# Patient Record
Sex: MALE | Race: WHITE | HISPANIC OR LATINO | Employment: UNEMPLOYED | URBAN - METROPOLITAN AREA
[De-identification: names, ages, dates, MRNs, and addresses within clinical notes are randomized per-mention and may not be internally consistent; named-entity substitution may affect disease eponyms.]

---

## 2017-01-19 ENCOUNTER — OFFICE VISIT (OUTPATIENT)
Dept: FAMILY MEDICINE | Facility: CLINIC | Age: 27
End: 2017-01-19

## 2017-01-19 VITALS
TEMPERATURE: 97.5 F | RESPIRATION RATE: 18 BRPM | WEIGHT: 291 LBS | BODY MASS INDEX: 43.1 KG/M2 | HEIGHT: 69 IN | DIASTOLIC BLOOD PRESSURE: 75 MMHG | HEART RATE: 70 BPM | SYSTOLIC BLOOD PRESSURE: 118 MMHG

## 2017-01-19 DIAGNOSIS — F43.23 ADJUSTMENT DISORDER WITH MIXED ANXIETY AND DEPRESSED MOOD: Primary | ICD-10-CM

## 2017-01-19 PROCEDURE — 99214 OFFICE O/P EST MOD 30 MIN: CPT | Performed by: PHYSICIAN ASSISTANT

## 2017-01-19 RX ORDER — HYDROXYZINE HYDROCHLORIDE 25 MG/1
25-50 TABLET, FILM COATED ORAL EVERY 6 HOURS PRN
Qty: 30 TABLET | Refills: 1 | Status: SHIPPED | OUTPATIENT
Start: 2017-01-19 | End: 2017-04-11

## 2017-01-19 RX ORDER — CITALOPRAM HYDROBROMIDE 20 MG/1
TABLET ORAL
Qty: 30 TABLET | Refills: 1 | Status: SHIPPED | OUTPATIENT
Start: 2017-01-19 | End: 2017-04-11

## 2017-01-19 ASSESSMENT — PATIENT HEALTH QUESTIONNAIRE - PHQ9: 5. POOR APPETITE OR OVEREATING: SEVERAL DAYS

## 2017-01-19 ASSESSMENT — ANXIETY QUESTIONNAIRES
3. WORRYING TOO MUCH ABOUT DIFFERENT THINGS: SEVERAL DAYS
IF YOU CHECKED OFF ANY PROBLEMS ON THIS QUESTIONNAIRE, HOW DIFFICULT HAVE THESE PROBLEMS MADE IT FOR YOU TO DO YOUR WORK, TAKE CARE OF THINGS AT HOME, OR GET ALONG WITH OTHER PEOPLE: SOMEWHAT DIFFICULT
1. FEELING NERVOUS, ANXIOUS, OR ON EDGE: SEVERAL DAYS
6. BECOMING EASILY ANNOYED OR IRRITABLE: NEARLY EVERY DAY
GAD7 TOTAL SCORE: 9
5. BEING SO RESTLESS THAT IT IS HARD TO SIT STILL: NOT AT ALL
2. NOT BEING ABLE TO STOP OR CONTROL WORRYING: NOT AT ALL
7. FEELING AFRAID AS IF SOMETHING AWFUL MIGHT HAPPEN: NEARLY EVERY DAY

## 2017-01-19 NOTE — NURSING NOTE
"Chief Complaint   Patient presents with     Anxiety     Initial /75 mmHg  Pulse 70  Temp(Src) 97.5  F (36.4  C) (Oral)  Resp 18  Ht 5' 9\" (1.753 m)  Wt 291 lb (131.997 kg)  BMI 42.95 kg/m2 Estimated body mass index is 42.95 kg/(m^2) as calculated from the following:    Height as of this encounter: 5' 9\" (1.753 m).    Weight as of this encounter: 291 lb (131.997 kg)..  BP completed using cuff size large-RA      "

## 2017-01-19 NOTE — MR AVS SNAPSHOT
"              After Visit Summary   2017    Romeo Gomez    MRN: [id number]           Patient Information     Date Of Birth          1990        Visit Information        Provider Department      2017 2:30 PM Reg Curry PA-C [facility]        Today's Diagnoses     Adjustment disorder with mixed anxiety and depressed mood    -  1        Follow-ups after your visit        Who to contact     If you have questions or need follow up information about today's clinic visit or your schedule please contact [facility] directly at [phone].  Normal or non-critical lab and imaging results will be communicated to you by [employer], letter or phone within 4 business days after the clinic has received the results. If you do not hear from us within 7 days, please contact the clinic through [employer] or phone. If you have a critical or abnormal lab result, we will notify you by phone as soon as possible.  Submit refill requests through [employer] or call your pharmacy and they will forward the refill request to us. Please allow 3 business days for your refill to be completed.          Additional Information About Your Visit        MyChart Information     [employer] lets you send messages to your doctor, view your test results, renew your prescriptions, schedule appointments and more. To sign up, go to [email or web address] . Click on \"Log in\" on the left side of the screen, which will take you to the Welcome page. Then click on \"Sign up Now\" on the right side of the page.     You will be asked to enter the access code listed below, as well as some personal information. Please follow the directions to create your username and password.     Your access code is: [email or web address]  Expires: 2017  9:44 AM     Your access code will  in 90 days. If you need help or a new code, please call your [facility] or [phone].        Care EveryWhere ID     This is " "your Care EveryWhere ID. This could be used by other organizations to access your [place] medical records  [id number]        Your Vitals Were     Pulse Temperature Respirations Height BMI (Body Mass Index)       70 97.5  F (36.4  C) (Oral) 18 5' 9\" (1.753 m) 42.95 kg/m2        Blood Pressure from Last 3 Encounters:   01/19/17 118/75   11/28/16 118/75   09/02/16 116/72    Weight from Last 3 Encounters:   01/19/17 291 lb (131.997 kg)   11/28/16 285 lb (129.275 kg)   09/02/16 279 lb (126.554 kg)              Today, you had the following     No orders found for display         Today's Medication Changes          These changes are accurate as of: 1/19/17  3:31 PM.  If you have any questions, ask your nurse or doctor.               Start taking these medicines.        Dose/Directions    citalopram 20 MG tablet   Commonly known as:  celeXA   Used for:  Adjustment disorder with mixed anxiety and depressed mood   Started by:  Reg Curry PA-C        Take 1/2 tablet (10 mg) for 1-2 weeks, then increase to 1 tablet orally daily   Quantity:  30 tablet   Refills:  1       hydrOXYzine 25 MG tablet   Commonly known as:  ATARAX   Used for:  Adjustment disorder with mixed anxiety and depressed mood   Started by:  Reg Curry PA-C        Dose:  25-50 mg   Take 1-2 tablets (25-50 mg) by mouth every 6 hours as needed for anxiety   Quantity:  30 tablet   Refills:  1            Where to get your medicines      These medications were sent to [facility] [address] [place] [address] AT [place] [address]  [id number] [place] [id number]    Hours:  24-hours Phone:  [phone]    - citalopram 20 MG tablet  - hydrOXYzine 25 MG tablet             Primary Care Provider Office Phone # Fax #    [facility] [phone]       [address] [place] [address]        Thank you!     Thank you for choosing [facility]  for " your care. Our goal is always to provide you with excellent care. Hearing back from our patients is one way we can continue to improve our services. Please take a few minutes to complete the written survey that you may receive in the mail after your visit with us. Thank you!             Your Updated Medication List - Protect others around you: Learn how to safely use, store and throw away your medicines at www.disposemymeds.org.          This list is accurate as of: 1/19/17  3:31 PM.  Always use your most recent med list.                   Brand Name Dispense Instructions for use    citalopram 20 MG tablet    celeXA    30 tablet    Take 1/2 tablet (10 mg) for 1-2 weeks, then increase to 1 tablet orally daily       hydrOXYzine 25 MG tablet    ATARAX    30 tablet    Take 1-2 tablets (25-50 mg) by mouth every 6 hours as needed for anxiety

## 2017-01-19 NOTE — PROGRESS NOTES
"  SUBJECTIVE:                                                    Romeo Gomez is a 26 year old male who presents to clinic today for the following health issues:      Anxiety Follow-Up    Status since last visit: little worse-not feeling like self-difficult to explain. Still having \"spacing\" episodes described at last visit. Also admits to being scarred to do anything that requires responsibilities.     Other associated symptoms:None    Complicating factors:   Significant life event: No   Current substance abuse: None  Depression symptoms: No  RIA-7 SCORE 1/19/2017   Total Score 9        GAD7     Has attempted zoloft in the past which resulted in worsening symptoms. This was only taken for a few days. Also, CBT was attempted, but due to lack of insurance, could not afford this. Has not done a session yet.     Problem list and histories reviewed & adjusted, as indicated.  Additional history: as documented    Problem list, Medication list, Allergies, and Medical/Social/Surgical histories reviewed in EPIC and updated as appropriate.    ROS:  Constitutional, psych, cardiovascular, pulmonary, gi systems are negative, except as otherwise noted.    OBJECTIVE:                                                    /75 mmHg  Pulse 70  Temp(Src) 97.5  F (36.4  C) (Oral)  Resp 18  Ht 5' 9\" (1.753 m)  Wt 291 lb (131.997 kg)  BMI 42.95 kg/m2  Body mass index is 42.95 kg/(m^2).  GENERAL: alert, no distress and obese  PSYCH: mentation appears normal, anxious and judgement and insight intact    Diagnostic Test Results:  none      ASSESSMENT/PLAN:                                                      (F43.23) Adjustment disorder with mixed anxiety and depressed mood  (primary encounter diagnosis)  Comment: spent long amount of time discussing management options between medications options as well CBT. States was going to attempt CBT, but because of cost, this was not started. Is will to attempt medication for now, " which I think will help symptoms to the point where insurance can be obtained. Follow up in 1 month.   Plan: hydrOXYzine (ATARAX) 25 MG tablet, citalopram         (CELEXA) 20 MG tablet        -Medication use and side effects discussed with the patient. Patient is in complete understanding and agreement with plan.         Follow up: 1 month    33 minutes. Greater than 50% of the time was spent face to face counseling regarding his conditions and treatment options as described above.       Reg Curry PA-C  [facility]

## 2017-01-20 ASSESSMENT — ANXIETY QUESTIONNAIRES: GAD7 TOTAL SCORE: 9

## 2017-01-20 ASSESSMENT — PATIENT HEALTH QUESTIONNAIRE - PHQ9: SUM OF ALL RESPONSES TO PHQ QUESTIONS 1-9: 6

## 2017-04-11 ENCOUNTER — RADIANT APPOINTMENT (OUTPATIENT)
Dept: GENERAL RADIOLOGY | Facility: CLINIC | Age: 27
End: 2017-04-11
Attending: PHYSICIAN ASSISTANT
Payer: COMMERCIAL

## 2017-04-11 ENCOUNTER — OFFICE VISIT (OUTPATIENT)
Dept: FAMILY MEDICINE | Facility: CLINIC | Age: 27
End: 2017-04-11
Payer: COMMERCIAL

## 2017-04-11 VITALS
BODY MASS INDEX: 41.62 KG/M2 | DIASTOLIC BLOOD PRESSURE: 70 MMHG | RESPIRATION RATE: 18 BRPM | WEIGHT: 281 LBS | OXYGEN SATURATION: 99 % | HEIGHT: 69 IN | TEMPERATURE: 98.2 F | SYSTOLIC BLOOD PRESSURE: 118 MMHG | HEART RATE: 67 BPM

## 2017-04-11 DIAGNOSIS — M25.512 ACUTE PAIN OF BOTH SHOULDERS: ICD-10-CM
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
## 2017-04-11 DIAGNOSIS — M25.532 LEFT WRIST PAIN: ICD-10-CM
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
## 2017-04-11 DIAGNOSIS — M54.2 CERVICALGIA: ICD-10-CM

## 2017-04-11 DIAGNOSIS — M25.511 ACUTE PAIN OF BOTH SHOULDERS: ICD-10-CM

## 2017-04-11 DIAGNOSIS — M25.562 ACUTE PAIN OF LEFT KNEE: ICD-10-CM

## 2017-04-11 PROCEDURE — 73030 X-RAY EXAM OF SHOULDER: CPT | Mod: LT

## 2017-04-11 PROCEDURE — 73030 X-RAY EXAM OF SHOULDER: CPT | Mod: RT

## 2017-04-11 PROCEDURE — 99214 OFFICE O/P EST MOD 30 MIN: CPT | Performed by: PHYSICIAN ASSISTANT

## 2017-04-11 PROCEDURE — 73130 X-RAY EXAM OF HAND: CPT | Mod: LT

## 2017-04-11 PROCEDURE — 72040 X-RAY EXAM NECK SPINE 2-3 VW: CPT

## 2017-04-11 PROCEDURE — 73562 X-RAY EXAM OF KNEE 3: CPT | Mod: LT

## 2017-04-11 RX ORDER — NAPROXEN 500 MG/1
500 TABLET ORAL 2 TIMES DAILY WITH MEALS
Qty: 60 TABLET | Refills: 1 | Status: SHIPPED | OUTPATIENT
Start: 2017-04-11 | End: 2017-08-05

## 2017-04-11 RX ORDER — CYCLOBENZAPRINE HCL 10 MG
5-10 TABLET ORAL 3 TIMES DAILY PRN
Qty: 30 TABLET | Refills: 1 | Status: SHIPPED | OUTPATIENT
Start: 2017-04-11 | End: 2017-08-05
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
## 2017-04-26 ENCOUNTER — RADIANT APPOINTMENT (OUTPATIENT)
Dept: GENERAL RADIOLOGY | Facility: CLINIC | Age: 27
End: 2017-04-26
Attending: PHYSICIAN ASSISTANT
Payer: COMMERCIAL

## 2017-04-26 ENCOUNTER — OFFICE VISIT (OUTPATIENT)
Dept: FAMILY MEDICINE | Facility: CLINIC | Age: 27
End: 2017-04-26
Payer: COMMERCIAL

## 2017-04-26 VITALS
SYSTOLIC BLOOD PRESSURE: 120 MMHG | DIASTOLIC BLOOD PRESSURE: 70 MMHG | HEART RATE: 57 BPM | TEMPERATURE: 98.1 F | WEIGHT: 280 LBS | RESPIRATION RATE: 16 BRPM | BODY MASS INDEX: 41.35 KG/M2

## 2017-04-26 DIAGNOSIS — M25.532 LEFT WRIST PAIN: ICD-10-CM

## 2017-04-26 DIAGNOSIS — M25.562 ACUTE PAIN OF BOTH KNEES: ICD-10-CM

## 2017-04-26 DIAGNOSIS — M25.561 ACUTE PAIN OF BOTH KNEES: ICD-10-CM

## 2017-04-26 DIAGNOSIS — M54.2 CERVICALGIA: ICD-10-CM
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
## 2017-04-26 DIAGNOSIS — R20.2 PARESTHESIA: ICD-10-CM

## 2017-04-26 PROCEDURE — 99213 OFFICE O/P EST LOW 20 MIN: CPT | Performed by: PHYSICIAN ASSISTANT

## 2017-04-26 PROCEDURE — 73110 X-RAY EXAM OF WRIST: CPT | Mod: LT
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
## 2017-05-19 ENCOUNTER — TELEPHONE (OUTPATIENT)
Dept: FAMILY MEDICINE | Facility: CLINIC | Age: 27
End: 2017-05-19

## 2017-05-19 NOTE — TELEPHONE ENCOUNTER
Panel Management Review      Patient has the following on his problem list:     Composite cancer screening  Chart review shows that this patient is due/due soon for the following None  Summary:    Patient is due/failing the following:   None -error encounter closed    Action needed:   error  Type of outreach:  None needed-error

## 2017-08-05 ENCOUNTER — HOSPITAL ENCOUNTER (EMERGENCY)
Facility: CLINIC | Age: 27
Discharge: HOME | End: 2017-08-05
Attending: EMERGENCY MEDICINE | Admitting: EMERGENCY MEDICINE

## 2017-08-05 VITALS
RESPIRATION RATE: 18 BRPM | HEART RATE: 98 BPM | TEMPERATURE: 98.9 F | SYSTOLIC BLOOD PRESSURE: 121 MMHG | HEIGHT: 69 IN | WEIGHT: 290 LBS | OXYGEN SATURATION: 99 % | DIASTOLIC BLOOD PRESSURE: 82 MMHG | BODY MASS INDEX: 42.95 KG/M2

## 2017-08-05 DIAGNOSIS — L03.90 CELLULITIS, UNSPECIFIED CELLULITIS SITE: ICD-10-CM

## 2017-08-05 PROCEDURE — 99283 EMERGENCY DEPT VISIT LOW MDM: CPT

## 2017-08-05 PROCEDURE — 25000132 ZZH RX MED GY IP 250 OP 250 PS 637: Performed by: EMERGENCY MEDICINE

## 2017-08-05 RX ORDER — CEPHALEXIN 500 MG/1
500 CAPSULE ORAL ONCE
Status: COMPLETED | OUTPATIENT
Start: 2017-08-05 | End: 2017-08-05

## 2017-08-05 RX ORDER — CEPHALEXIN 500 MG/1
500 CAPSULE ORAL 4 TIMES DAILY
Qty: 40 CAPSULE | Refills: 0 | Status: SHIPPED | OUTPATIENT
Start: 2017-08-05 | End: 2017-08-15

## 2017-08-05 RX ADMIN — CEPHALEXIN 500 MG: 500 CAPSULE ORAL at 00:53

## 2017-08-05 ASSESSMENT — ENCOUNTER SYMPTOMS
MYALGIAS: 1
FEVER: 0
COLOR CHANGE: 1
CHILLS: 0

## 2017-08-05 NOTE — ED PROVIDER NOTES
"  History     Chief Complaint:  Leg Swelling     HPI   Romeo Gomez is a 26 year old male who presents accompanied by his family for evaluation of leg swelling. Several days ago, the patient started to develop redness and swelling in his right shin after popping a pimple. This area has been warm to the touch and his pain worsens with palpation. Notably, the patient is a  and spends time resting on his knees, which particularly exacerbates his pain. Tonight, due to his ongoing symptoms the patient chose to seek evaluation in the ED with primary concern that he could have an infection. Currently in the ED, the patient rates his pain at a severity of 5/10. Otherwise, he has not had any fevers or chills in association with his current symptoms.     Allergies:  NKDA    Medications:    The patient is not currently taking any prescribed medications.     Past Medical History:    Anxiety  Obesity     Past Surgical History:    History reviewed. No pertinent past surgical history.     Family History:    Kidney disease - Father  Heart disease - Father     Social History:  Tobacco use:    Former smoker - 0.25 packs / day   Alcohol use:    Negative  Marital status:    Single   Accompanied to ED by:  Family      Review of Systems   Constitutional: Negative for chills and fever.   Cardiovascular: Positive for leg swelling (right ).   Musculoskeletal: Positive for myalgias (right lower leg).   Skin: Positive for color change (redness, right lower leg).   All other systems reviewed and are negative.    Physical Exam   First Vitals:  BP: 121/82  Pulse: 98  Temp: 98.9  F (37.2  C)  Resp: 18  Height: 175.3 cm (5' 9\")  Weight: 131.5 kg (290 lb)  SpO2: 99 %      Physical Exam  Constitutional:  Oriented to person, place, and time. Well-appearing.   HENT:   Head:    Normocephalic.   Mouth/Throat:   Oropharynx is clear and moist.   Eyes:    EOM are normal. Pupils are equal, round, and reactive to light.   Neck:    Neck " supple.   Musculoskeletal:  Erythema noted on 2/3rd's of his right proximal anterior tib/fib, non-circumferential. Warmth. Mildly tender to palpation. No edema. No posterior leg tenderness. No fluctuance or induration.   Neurological:   Alert and oriented to person, place, and time.           Moves all 4 extremities spontaneously       Right leg neurovascularly intact.   Skin:    Right leg erythema and warmth. No pallor.     Emergency Department Course     Interventions:  0053 Keflex 500 mg PO     Emergency Department Course:  Nursing notes and vitals reviewed.  0043: I performed an exam of the patient as documented above.     Findings and plan explained to the Patient and family. Patient discharged home with instructions regarding supportive care, medications, and reasons to return. The importance of close follow-up was reviewed. The patient was prescribed Keflex.      Impression & Plan      Medical Decision Making:  Romeo Gomez is a 26 year old male who presents for evaluation of skin redness.  The history, physical exam and supporting data are consistent with cellulitis.  There do not appear at this time to be any complication of cellulitis including necrotizing fascitis, lymphangitis, lymphadenitis, abscess, osteomyelitis, sepsis, or shock.  The patient is not immunosuppressed.  Supportive outpatient management is indicated with antibiotics.  Close follow-up of primary care physician to ensure no progression and rapid resolution.  Area of cellulitis was outlined.  Cellulitis precautions for home.        Diagnosis:    ICD-10-CM   1. Cellulitis, unspecified cellulitis site L03.90       Disposition:  Discharged to home with Keflex.     Discharge Medications:  New Prescriptions    CEPHALEXIN (KEFLEX) 500 MG CAPSULE    Take 1 capsule (500 mg) by mouth 4 times daily for 10 days         Rohan PERALTA am serving as a scribe at 12:43 AM on 8/5/2017 to document services personally performed by Dr. Blanton,  based on my observations and the provider's statements to me.      [facility] EMERGENCY DEPARTMENT       Joseph Blanton MD  08/05/17 0139

## 2017-08-05 NOTE — ED AVS SNAPSHOT
[facility] Emergency Department    [address] [place] [address]    Phone:  [phone]    Fax:  [phone]                                       Romeo Gomez   MRN: [id number]    Department:  [facility] Emergency Department   Date of Visit:  8/5/2017           Patient Information     Date Of Birth          1990        Your diagnoses for this visit were:     Cellulitis, unspecified cellulitis site        You were seen by Joseph Blanton MD.      Follow-up Information     Follow up with your doctor. Call in 3 days.        Discharge Instructions         Discharge Instructions  Cellulitis    Cellulitis is an infection of the skin that occurs when bacteria enter the skin.   Symptoms are generally redness, swelling, warmth and pain.  Your infection appeared to be appropriate to treat at home with antibiotics.  However, sometimes your infection may be worse than it seemed at first, or may worsen with time. If you have new or worse symptoms, you may need to be seen again in the Emergency Department or by your primary doctor.    Return to the Emergency Department if:    The redness, pain, or swelling gets a lot worse.  If the red area was marked, return if it is red beyond the marked area.    You are unable to get your antibiotics, or are vomiting them up or you can t take them.    You are feeling more ill, weak or lightheaded.    You start to run a new fever (temperature >101).    Anything else about the infection worries or concerns you.  Treatment:    Start your antibiotics right away, and take them as prescribed. Be sure to finish the whole prescription, even if you are better.    Apply a heating pad, warm packs, or warm water soaks to the infected area for 15 minutes at a time, at least 3 times a day. Do not use a heating pad on your feet or legs if you have diabetes. Do not sleep with a heating pad on, since this can cause burns or skin injury.    Rest  "your injured area for at least 1-2 days. After that you may start using your extremity again as long as there is not too much pain.     Raise the injured area above the level of your heart as much as possible in the first 1-2 days.    Tylenol  (acetaminophen), Motrin  (ibuprofen), or Advil  (ibuprofen) may help may help reduce pain and fever and may help you feel more comfortable. Be sure to read and follow the package directions, and ask your doctor if you have questions.    Follow-up with your doctor:    Re-check in clinic within 2-3 days.  Probiotics: If you have been given an antibiotic, you may want to also take a probiotic pill or eat yogurt with live cultures. Probiotics have \"good bacteria\" to help your intestines stay healthy. Studies have shown that probiotics help prevent diarrhea and other intestine problems (including C. diff infection) when you take antibiotics. You can buy these without a prescription in the pharmacy section of the store.     If you were given a prescription for medicine here today, be sure to read all of the information (including the package insert) that comes with your prescription.  This will include important information about the medicine, its side effects, and any warnings that you need to know about.  The pharmacist who fills the prescription can provide more information and answer questions you may have about the medicine.  If you have questions or concerns that the pharmacist cannot address, please call or return to the Emergency Department.     Opioid Medication Information    Pain medications are among the most commonly prescribed medicines, so we are including this information for all our patients. If you did not receive pain medication or get a prescription for pain medicine, you can ignore it.     You may have been given a prescription for an opioid (narcotic) pain medicine and/or have received a pain medicine while here in the Emergency Department. These medicines can " make you drowsy or impaired. You must not drive, operate dangerous equipment, or engage in any other dangerous activities while taking these medications. If you drive while taking these medications, you could be arrested for DUI, or driving under the influence. Do not drink any alcohol while you are taking these medications.     Opioid pain medications can cause addiction. If you have a history of chemical dependency of any type, you are at a higher risk of becoming addicted to pain medications.  Only take these prescribed medications to treat your pain when all other options have been tried. Take it for as short a time and as few doses as possible. Store your pain pills in a secure place, as they are frequently stolen and provide a dangerous opportunity for children or visitors in your house to start abusing these powerful medications. We will not replace any lost or stolen medicine.  As soon as your pain is better, you should flush all your remaining medication.     Many prescription pain medications contain Tylenol  (acetaminophen), including Vicodin , Tylenol #3 , Norco , Lortab , and Percocet .  You should not take any extra pills of Tylenol  if you are using these prescription medications or you can get very sick.  Do not ever take more than 3000 mg of acetaminophen in any 24 hour period.    All opioids tend to cause constipation. Drink plenty of water and eat foods that have a lot of fiber, such as fruits, vegetables, prune juice, apple juice and high fiber cereal.  Take a laxative if you don t move your bowels at least every other day. Miralax , Milk of Magnesia, Colace , or Senna  can be used to keep you regular.      Remember that you can always come back to the Emergency Department if you are not able to see your regular doctor in the amount of time listed above, if you get any new symptoms, or if there is anything that worries you.        24 Hour Appointment Hotline       To make an appointment at any  [facility], call [phone] ([phone]). If you don't have a family doctor or clinic, we will help you find one. [facility] are conveniently located to serve the needs of you and your family.             Review of your medicines      START taking        Dose / Directions Last dose taken    cephALEXin 500 MG capsule   Commonly known as:  KEFLEX   Dose:  500 mg   Quantity:  40 capsule        Take 1 capsule (500 mg) by mouth 4 times daily for 10 days   Refills:  0          Our records show that you are taking the medicines listed below. If these are incorrect, please call your family doctor or clinic.        Dose / Directions Last dose taken    * order for DME   Quantity:  1 Device        Equipment being ordered: left knee brace   Refills:  0        * order for DME   Quantity:  1 Device        Equipment being ordered: left thumb spica   Refills:  0        * Notice:  This list has 2 medication(s) that are the same as other medications prescribed for you. Read the directions carefully, and ask your doctor or other care provider to review them with you.            Prescriptions were sent or printed at these locations (1 Prescription)                   Other Prescriptions                Printed at Department/Unit printer (1 of 1)         cephALEXin (KEFLEX) 500 MG capsule                Orders Needing Specimen Collection     None      Pending Results     No orders found from 8/3/2017 to 8/6/2017.            Pending Culture Results     No orders found from 8/3/2017 to 8/6/2017.            Pending Results Instructions     If you had any lab results that were not finalized at the time of your Discharge, you can call the ED Lab Result RN at [phone]. You will be contacted by this team for any positive Lab results or changes in treatment. The nurses are available 7 days a week from 10A to 6:30P.  You can leave a message 24 hours per day and they will return your call.        Test Results From Your  Hospital Stay               Clinical Quality Measure: Blood Pressure Screening     Your blood pressure was checked while you were in the emergency department today. The last reading we obtained was  BP: 121/82 . Please read the guidelines below about what these numbers mean and what you should do about them.  If your systolic blood pressure (the top number) is less than 120 and your diastolic blood pressure (the bottom number) is less than 80, then your blood pressure is normal. There is nothing more that you need to do about it.  If your systolic blood pressure (the top number) is 120-139 or your diastolic blood pressure (the bottom number) is 80-89, your blood pressure may be higher than it should be. You should have your blood pressure rechecked within a year by a primary care provider.  If your systolic blood pressure (the top number) is 140 or greater or your diastolic blood pressure (the bottom number) is 90 or greater, you may have high blood pressure. High blood pressure is treatable, but if left untreated over time it can put you at risk for heart attack, stroke, or kidney failure. You should have your blood pressure rechecked by a primary care provider within the next 4 weeks.  If your provider in the emergency department today gave you specific instructions to follow-up with your doctor or provider even sooner than that, you should follow that instruction and not wait for up to 4 weeks for your follow-up visit.        Thank you for choosing [facility]       Thank you for choosing [facility] for your care. Our goal is always to provide you with excellent care. Hearing back from our patients is one way we can continue to improve our services. Please take a few minutes to complete the written survey that you may receive in the mail after you visit with us. Thank you!        [employer] Information     [employer] lets you send messages to your doctor, view your test results, renew your prescriptions, schedule  "appointments and more. To sign up, go to [email or web address] . Click on \"Log in\" on the left side of the screen, which will take you to the Welcome page. Then click on \"Sign up Now\" on the right side of the page.     You will be asked to enter the access code listed below, as well as some personal information. Please follow the directions to create your username and password.     Your access code is: [email or web address]  Expires: 11/3/2017 12:49 AM     Your access code will  in 90 days. If you need help or a new code, please call your [place] clinic or [phone].        Care EveryWhere ID     This is your Care EveryWhere ID. This could be used by other organizations to access your [place] medical records  [id number]        Equal Access to Services     VICTOR HUGO DESAI : Carolin Galvan, sage hooks, abebe santana, marvin muñiz . So [facility] [phone].    ATENCIÓN: Si habla español, tiene a gonzalez disposición servicios gratuitos de asistencia lingüística. Llame al [phone].    We comply with applicable federal civil rights laws and [place] laws. We do not discriminate on the basis of race, color, national origin, age, disability sex, sexual orientation or gender identity.            After Visit Summary       This is your record. Keep this with you and show to your community pharmacist(s) and doctor(s) at your next visit.                  "

## 2017-08-05 NOTE — ED NOTES
Right lower leg  Swelling red and warm to touch noted today after work   No fever or chills   Here for eval

## 2017-08-05 NOTE — ED AVS SNAPSHOT
[facility] Emergency Department    [address]    [facility] [address]    Phone:  [phone]    Fax:  [phone]                                       Romeo Gomez   MRN: [id number]    Department:  [facility] Emergency Department   Date of Visit:  8/5/2017           After Visit Summary Signature Page     I have received my discharge instructions, and my questions have been answered. I have discussed any challenges I see with this plan with the nurse or doctor.    ..........................................................................................................................................  Patient/Patient Representative Signature      ..........................................................................................................................................  Patient Representative Print Name and Relationship to Patient    ..................................................               ................................................  Date                                            Time    ..........................................................................................................................................  Reviewed by Signature/Title    ...................................................              ..............................................  Date                                                            Time

## 2017-11-20 PROBLEM — F41.9 ANXIETY: Status: ACTIVE | Noted: 2017-11-20

## 2018-05-04 ENCOUNTER — HOSPITAL ENCOUNTER (EMERGENCY)
Facility: CLINIC | Age: 28
Discharge: HOME | End: 2018-05-04
Attending: EMERGENCY MEDICINE | Admitting: EMERGENCY MEDICINE

## 2018-05-04 ENCOUNTER — APPOINTMENT (OUTPATIENT)
Dept: GENERAL RADIOLOGY | Facility: CLINIC | Age: 28
End: 2018-05-04
Attending: EMERGENCY MEDICINE

## 2018-05-04 VITALS
DIASTOLIC BLOOD PRESSURE: 103 MMHG | TEMPERATURE: 98.5 F | RESPIRATION RATE: 14 BRPM | SYSTOLIC BLOOD PRESSURE: 156 MMHG | HEART RATE: 76 BPM | OXYGEN SATURATION: 100 %

## 2018-05-04 PROCEDURE — 99284 EMERGENCY DEPT VISIT MOD MDM: CPT

## 2018-05-04 PROCEDURE — 29130 APPL FINGER SPLINT STATIC: CPT | Mod: F3

## 2018-05-04 PROCEDURE — 25000132 ZZH RX MED GY IP 250 OP 250 PS 637: Performed by: EMERGENCY MEDICINE

## 2018-05-04 PROCEDURE — 73140 X-RAY EXAM OF FINGER(S): CPT | Mod: LT

## 2018-05-04 RX ORDER — IBUPROFEN 600 MG/1
600 TABLET, FILM COATED ORAL ONCE
Status: COMPLETED | OUTPATIENT
Start: 2018-05-04 | End: 2018-05-04

## 2018-05-04 RX ADMIN — IBUPROFEN 600 MG: 600 TABLET ORAL at 22:58

## 2018-05-04 ASSESSMENT — ENCOUNTER SYMPTOMS: ARTHRALGIAS: 1

## 2018-05-04 NOTE — ED AVS SNAPSHOT
[facility] Emergency Department    [address]    [facility] [address]    Phone:  [phone]    Fax:  [phone]                                       Romeo Gomez   MRN: [id number]    Department:  [facility] Emergency Department   Date of Visit:  5/4/2018           Patient Information     Date Of Birth          1990        Your diagnoses for this visit were:     Strain of left ring finger, initial encounter        You were seen by Shayy Lawrence MD.      Follow-up Information     Follow up with [facility].    Why:  within 3-5 days (call the number provided on the card on Monday)        Follow up with [facility] Emergency Department.    Specialty:  EMERGENCY MEDICINE    Why:  As needed, If symptoms worsen    Contact information:    [address]  [facility] [address]  [phone]      Discharge References/Attachments     FINGER SPRAIN (ENGLISH)      24 Hour Appointment Hotline       To make an appointment at any [facility] clinic, call [phone]). If you don't have a family doctor or clinic, we will help you find one. [facility] clinics are conveniently located to serve the needs of you and your family.             Review of your medicines      Our records show that you are taking the medicines listed below. If these are incorrect, please call your family doctor or clinic.        Dose / Directions Last dose taken    * order for DME   Quantity:  1 Device        Equipment being ordered: left knee brace   Refills:  0        * order for DME   Quantity:  1 Device        Equipment being ordered: left thumb spica   Refills:  0        * Notice:  This list has 2 medication(s) that are the same as other medications prescribed for you. Read the directions carefully, and ask your doctor or other care provider to review them with you.            Procedures and tests performed during  your visit     Fingers XR, 2-3 views, left      Orders Needing Specimen Collection     None      Pending Results     No orders found from 5/2/2018 to 5/5/2018.            Pending Culture Results     No orders found from 5/2/2018 to 5/5/2018.            Pending Results Instructions     If you had any lab results that were not finalized at the time of your Discharge, you can call the ED Lab Result RN at [phone]. You will be contacted by this team for any positive Lab results or changes in treatment. The nurses are available 7 days a week from 10A to 6:30P.  You can leave a message 24 hours per day and they will return your call.        Test Results From Your Hospital Stay        5/4/2018 10:49 PM      Narrative     FINGER(S) TWO-THREE VIEWS LEFT  5/4/2018 10:45 PM     HISTORY: PAIN AND SWELLING;     COMPARISON: None.    FINDINGS: There is normal osseous alignment.  No fractures are  identified.        Impression     IMPRESSION: Negative, osseous structures appear intact.    AMBER WRIGHT MD                Clinical Quality Measure: Blood Pressure Screening     Your blood pressure was checked while you were in the emergency department today. The last reading we obtained was  BP: (!) 156/103 . Please read the guidelines below about what these numbers mean and what you should do about them.  If your systolic blood pressure (the top number) is less than 120 and your diastolic blood pressure (the bottom number) is less than 80, then your blood pressure is normal. There is nothing more that you need to do about it.  If your systolic blood pressure (the top number) is 120-139 or your diastolic blood pressure (the bottom number) is 80-89, your blood pressure may be higher than it should be. You should have your blood pressure rechecked within a year by a primary care provider.  If your systolic blood pressure (the top number) is 140 or greater or your diastolic blood pressure (the bottom number) is 90 or greater, you may  "have high blood pressure. High blood pressure is treatable, but if left untreated over time it can put you at risk for heart attack, stroke, or kidney failure. You should have your blood pressure rechecked by a primary care provider within the next 4 weeks.  If your provider in the emergency department today gave you specific instructions to follow-up with your doctor or provider even sooner than that, you should follow that instruction and not wait for up to 4 weeks for your follow-up visit.        Thank you for choosing [facility]       Thank you for choosing [facility] for your care. Our goal is always to provide you with excellent care. Hearing back from our patients is one way we can continue to improve our services. Please take a few minutes to complete the written survey that you may receive in the mail after you visit with us. Thank you!        [employer] Information     [employer] lets you send messages to your doctor, view your test results, renew your prescriptions, schedule appointments and more. To sign up, go to [email or web address] . Click on \"Log in\" on the left side of the screen, which will take you to the Welcome page. Then click on \"Sign up Now\" on the right side of the page.     You will be asked to enter the access code listed below, as well as some personal information. Please follow the directions to create your username and password.     Your access code is: [email or web address]  Expires: 2018  1:26 PM     Your access code will  in 90 days. If you need help or a new code, please call your [facility] clinic or [phone].        Care EveryWhere ID     This is your Care EveryWhere ID. This could be used by other organizations to access your [facility] medical records  [id number]        Equal Access to Services     VICTOR HUGO DESAI : sage Duque qaybta kaalmada adeegyada, waxay idiin hayaan adeeg kharash la'aan ah. So [facility] [phone].    ATENCIÓN: Si raquel banks, " tiene a gonzalez disposición servicios gratuitos de asistencia lingüística. Llcandice al [phone].    We comply with applicable federal civil rights laws and [place] laws. We do not discriminate on the basis of race, color, national origin, age, disability, sex, sexual orientation, or gender identity.            After Visit Summary       This is your record. Keep this with you and show to your community pharmacist(s) and doctor(s) at your next visit.

## 2018-05-04 NOTE — ED AVS SNAPSHOT
[facility] Emergency Department    [address]    [facility] [address]    Phone:  [phone]    Fax:  [phone]                                       Romeo Gomez   MRN: [id number]    Department:  [facility] Emergency Department   Date of Visit:  5/4/2018           After Visit Summary Signature Page     I have received my discharge instructions, and my questions have been answered. I have discussed any challenges I see with this plan with the nurse or doctor.    ..........................................................................................................................................  Patient/Patient Representative Signature      ..........................................................................................................................................  Patient Representative Print Name and Relationship to Patient    ..................................................               ................................................  Date                                            Time    ..........................................................................................................................................  Reviewed by Signature/Title    ...................................................              ..............................................  Date                                                            Time

## 2018-05-05 NOTE — ED PROVIDER NOTES
"  History     Chief Complaint:  Hand Pain      HPI   Romeo Gomez is a 27 year old male who presents to the emergency department today for evaluation of hand pain. For the last 3 weeks, the patient has been unable to fully flex his left 4th digit secondary to pain near the MCP joint. Today, he was driving when his finger slipped and \"cracked\" causing him to have increased pain. Here, he notes no injury or trauma to his finger though admits to heavy lifting at work and believes his finger could be strained. He is left hand dominant. Pain occasionally radiates upwards to his hand, specifically when he rests his arm in a certain position. No other concerns voiced at this time.     Allergies:  No Known Drug Allergies     Medications:    The patient is currently on no regular medications.    Past Medical History:    Anxiety  Obesity     Past Surgical History:    History reviewed. No pertinent past surgical history.    Family History:    Kidney disease  Heart disease     Social History:  The patient was accompanied to the ED by family.  Smoking Status: Former smoker  Smokeless Tobacco: Never used  Alcohol Use: No  Marital Status:  Single [1]    Review of Systems   Musculoskeletal: Positive for arthralgias.   All other systems reviewed and are negative.    Physical Exam   First Vitals:  BP: (!) 156/103  Pulse: 76  Heart Rate: 76  Temp: 98.5  F (36.9  C)  Resp: 14  SpO2: 100 %    Physical Exam  General: Adult male sitting upright   MSK: No edema. Nontender. Normal active range of motion. Tender over the proximal left ring finger with no edema, crepitus, or deformity, 5/5 extension strength of all joints of left ring finger, 4/5 strength with pain with flexion at all joints of left ring finger. Nontender over the rest of the left hand.  Skin: Warm and dry. No rashes or lesions or ecchymoses on visible skin. No distal cyanosis or pallor  Neuro: Alert and oriented. Responds appropriately to all questions and " commands. No focal findings appreciated. Normal muscle tone. Sensation intact to light touch over the left ring finger.   Psych: Normal mood and affect. Pleasant.    Emergency Department Course   Imaging:  Radiology findings were communicated with the patient who voiced understanding of the findings.    Fingers XR, 2-3 Views, Left:  Negative, osseous structures appear intact.  Reading per radiology    Interventions:  2258- Ibuprofen 600 mg Oral      Emergency Department Course:  Nursing notes and vitals reviewed.  I performed an exam of the patient as documented above.     The patient was sent for a XR while in the emergency department, results above.     I discussed the treatment plan with the patient. They expressed understanding of this plan and consented to discharge.     Impression & Plan      Medical Decision Making:  Romeo Gomez is a 27 year old left hand dominant male who presents to the emergency department with concern for left ring finger pain. He did not have any direct trauma to it but does a job where he chronically uses his fingers to heavy life. It is possible he may have strained his finger as this is what his symptoms seem most consistent with. His neurovascularly intact and XR does not show signs for fracture of dislocation. He is placed in a alumafoam splint and buddy taped for comfort. He should follow up with [facility] in the next 3-5 days as needed. All questions answered prior to discharge. Tylenol and Ibuprofen as needed for pain.       Diagnosis:    ICD-10-CM    1. Strain of left ring finger, initial encounter S66.912A          Disposition:   Findings and plan explained to the Patient. Patient discharged home with instructions regarding supportive care, medications, and reasons to return. The importance of close follow-up was reviewed.     Scribe Disclosure:  Edwin PERALTA, am serving as a scribe at 10:30 PM on 5/4/2018 to document services personally  performed by Shayy Lawrence MD, based on my observations and the provider's statements to me.    5/4/2018   [facility] EMERGENCY DEPARTMENT       Shayy Lawrence MD  05/07/18 1515

## 2018-05-05 NOTE — ED TRIAGE NOTES
Patient comes in for evaluation of pain in the left ring finger which has been bothering him for about 3 weeks, no injury. Patient tonight was turning the steering wheel and he heard a crack and the pain became more severe and now it is more difficult to move.

## 2018-08-07 ENCOUNTER — APPOINTMENT (OUTPATIENT)
Dept: GENERAL RADIOLOGY | Facility: CLINIC | Age: 28
End: 2018-08-07
Attending: EMERGENCY MEDICINE

## 2018-08-07 ENCOUNTER — HOSPITAL ENCOUNTER (EMERGENCY)
Facility: CLINIC | Age: 28
Discharge: HOME | End: 2018-08-08
Attending: EMERGENCY MEDICINE | Admitting: EMERGENCY MEDICINE

## 2018-08-07 DIAGNOSIS — M71.162 SEPTIC PREPATELLAR BURSITIS OF LEFT KNEE: ICD-10-CM

## 2018-08-07 PROCEDURE — 73562 X-RAY EXAM OF KNEE 3: CPT | Mod: LT

## 2018-08-07 PROCEDURE — 99284 EMERGENCY DEPT VISIT MOD MDM: CPT

## 2018-08-07 PROCEDURE — 29505 APPLICATION LONG LEG SPLINT: CPT | Mod: LT

## 2018-08-07 ASSESSMENT — ENCOUNTER SYMPTOMS
FEVER: 0
COLOR CHANGE: 1
ARTHRALGIAS: 1
JOINT SWELLING: 1

## 2018-08-07 NOTE — ED AVS SNAPSHOT
[facility] Emergency Department    [address]    [facility] [address]    Phone:  [phone]    Fax:  [phone]                                       Romeo Gomez   MRN: [id number]    Department:  [facility] Emergency Department   Date of Visit:  8/7/2018           After Visit Summary Signature Page     I have received my discharge instructions, and my questions have been answered. I have discussed any challenges I see with this plan with the nurse or doctor.    ..........................................................................................................................................  Patient/Patient Representative Signature      ..........................................................................................................................................  Patient Representative Print Name and Relationship to Patient    ..................................................               ................................................  Date                                            Time    ..........................................................................................................................................  Reviewed by Signature/Title    ...................................................              ..............................................  Date                                                            Time

## 2018-08-07 NOTE — ED AVS SNAPSHOT
[facility] Emergency Department    [address]    [facility] [address]    Phone:  [phone]    Fax:  [phone]                                       Romeo Gomez   MRN: [id number]    Department:  [facility] Emergency Department   Date of Visit:  8/7/2018           Patient Information     Date Of Birth          1990        Your diagnoses for this visit were:     Septic prepatellar bursitis of left knee        You were seen by Radha Negrete MD.      Follow-up Information     Follow up with [facility] In 1 day.    Contact information:    [address], [facility] [address]  [facility] [address]  [phone]          Discharge Instructions         Bursitis  You have bursitis. This is an inflammation of the bursa. These are small, fluid-filled sacs that surround the larger joints of the body. The bursa help the muscles and tendons move smoothly over the joints.  Bursitis often happens in the shoulder. But it can also affect the elbows, hips, pelvis, knees, toes, and heels. Bursitis can be caused by injury, overuse of the joint, or infection of the bursa. Symptoms include pain and tenderness over a joint. Symptoms get worse with movement.  Bursitis is treated with an anti-inflammatory medicine and by resting the joint. More severe cases require injection of medicine directly into the bursa.    Home care    Rest the painful joint and protect it from movement. This will allow the inflammation to heal faster.    Apply an ice pack over the injured area for no more than 15 to 20 minutes. Do this every 3 to 6 hours for the first 24 to 48 hours. Keep using ice packs 3 to 4 times a day until the pain and swelling improves.     To make an ice pack, put ice cubes in a sealed plastic zip-lock bag. Wrap the bag in a clean, thin towel or cloth. Never put ice or an ice pack directly on the skin. As the ice melts, be careful to avoid getting any wrap or  splint wet.    You may take over-the-counter pain medicine to treat pain and inflammation, unless another medicine was prescribed. Anti-inflammatory pain medicines may be more effective. Talk with your provider beforeusing these medicines if you have chronic liver or kidney disease, or ever had a stomach ulcer or GI (gastrointestinal) bleeding.    As your symptoms improve, slowly begin to move the joint. Do not overuse the joint. This may cause the symptoms to flare up again.  When to seek medical advice  Call your healthcare provider right away if any of these occur:    Redness over the painful area    Increasing pain or swelling at the joint    Fever of 100.4 F (38 C) or above lasting for 24 to 48 hours  Date Last Reviewed: 11/21/2015 2000-2017 [employer] [address] [place] [address], [place] [address]. All rights reserved. This information is not intended as a substitute for professional medical care. Always follow your healthcare professional's instructions.          24 Hour Appointment Hotline       To make an appointment at any [facility], call [phone] ([phone]). If you don't have a family doctor or clinic, we will help you find one. [facility] are conveniently located to serve the needs of you and your family.             Review of your medicines      START taking        Dose / Directions Last dose taken    cephALEXin 500 MG capsule   Commonly known as:  KEFLEX   Dose:  500 mg   Quantity:  20 capsule        Take 1 capsule (500 mg) by mouth 4 times daily for 5 days   Refills:  0        HYDROcodone-acetaminophen 5-325 MG per tablet   Commonly known as:  NORCO   Dose:  1 tablet   Quantity:  10 tablet        Take 1 tablet by mouth every 6 hours as needed for severe pain   Refills:  0        ibuprofen 200 MG tablet   Commonly known as:  ADVIL/MOTRIN   Dose:  400 mg   Quantity:  60 tablet        Take 2 tablets (400 mg) by mouth every 8 hours as needed for pain   Refills:  0           Our records show that you are taking the medicines listed below. If these are incorrect, please call your family doctor or clinic.        Dose / Directions Last dose taken    * order for DME   Quantity:  1 Device        Equipment being ordered: left knee brace   Refills:  0        * order for DME   Quantity:  1 Device        Equipment being ordered: left thumb spica   Refills:  0        * Notice:  This list has 2 medication(s) that are the same as other medications prescribed for you. Read the directions carefully, and ask your doctor or other care provider to review them with you.            Information about OPIOIDS     PRESCRIPTION OPIOIDS: WHAT YOU NEED TO KNOW   We gave you an opioid (narcotic) pain medicine. It is important to manage your pain, but opioids are not always the best choice. You should first try all the other options your care team gave you. Take this medicine for as short a time (and as few doses) as possible.    Some activities can increase your pain, such as bandage changes or therapy sessions. It may help to take your pain medicine 30 to 60 minutes before these activities. Reduce your stress by getting enough sleep, working on hobbies you enjoy and practicing relaxation or meditation. Talk to your care team about ways to manage your pain beyond prescription opioids.    These medicines have risks:    DO NOT drive when on new or higher doses of pain medicine. These medicines can affect your alertness and reaction times, and you could be arrested for driving under the influence (DUI). If you need to use opioids long-term, talk to your care team about driving.    DO NOT operate heavy machinery    DO NOT do any other dangerous activities while taking these medicines.    DO NOT drink any alcohol while taking these medicines.     If the opioid prescribed includes acetaminophen, DO NOT take with any other medicines that contain acetaminophen. Read all labels carefully. Look for the word   acetaminophen  or  Tylenol.  Ask your pharmacist if you have questions or are unsure.    You can get addicted to pain medicines, especially if you have a history of addiction (chemical, alcohol or substance dependence). Talk to your care team about ways to reduce this risk.    All opioids tend to cause constipation. Drink plenty of water and eat foods that have a lot of fiber, such as fruits, vegetables, prune juice, apple juice and high-fiber cereal. Take a laxative (Miralax, milk of magnesia, Colace, Senna) if you don t move your bowels at least every other day. Other side effects include upset stomach, sleepiness, dizziness, throwing up, tolerance (needing more of the medicine to have the same effect), physical dependence and slowed breathing.    Store your pills in a secure place, locked if possible. We will not replace any lost or stolen medicine. If you don t finish your medicine, please throw away (dispose) as directed by your pharmacist. The [place] [employer] has more information about safe disposal: https://www.pca.[facility].mn.us/living-green/managing-unwanted-medications        Prescriptions were sent or printed at these locations (3 Prescriptions)                   Other Prescriptions                Printed at Department/Unit printer (3 of 3)         cephALEXin (KEFLEX) 500 MG capsule               HYDROcodone-acetaminophen (NORCO) 5-325 MG per tablet               ibuprofen (ADVIL/MOTRIN) 200 MG tablet                Procedures and tests performed during your visit     XR Knee Left 3 Views      Orders Needing Specimen Collection     None      Pending Results     Date and Time Order Name Status Description    8/7/2018 4147 XR Knee Left 3 Views Preliminary             Pending Culture Results     No orders found for last 3 day(s).            Pending Results Instructions     If you had any lab results that were not finalized at the time of your Discharge, you can call the ED Lab Result RN at  [phone]. You will be contacted by this team for any positive Lab results or changes in treatment. The nurses are available 7 days a week from 10A to 6:30P.  You can leave a message 24 hours per day and they will return your call.        Test Results From Your Hospital Stay        8/8/2018 12:25 AM      Narrative     XR KNEE LT 3 VW  8/8/2018 12:09 AM     HISTORY: Pain, likely bursitis.     COMPARISON: None.         Impression     IMPRESSION: No acute fracture or dislocation.                Clinical Quality Measure: Blood Pressure Screening     Your blood pressure was checked while you were in the emergency department today. The last reading we obtained was  BP: (!) 132/94 . Please read the guidelines below about what these numbers mean and what you should do about them.  If your systolic blood pressure (the top number) is less than 120 and your diastolic blood pressure (the bottom number) is less than 80, then your blood pressure is normal. There is nothing more that you need to do about it.  If your systolic blood pressure (the top number) is 120-139 or your diastolic blood pressure (the bottom number) is 80-89, your blood pressure may be higher than it should be. You should have your blood pressure rechecked within a year by a primary care provider.  If your systolic blood pressure (the top number) is 140 or greater or your diastolic blood pressure (the bottom number) is 90 or greater, you may have high blood pressure. High blood pressure is treatable, but if left untreated over time it can put you at risk for heart attack, stroke, or kidney failure. You should have your blood pressure rechecked by a primary care provider within the next 4 weeks.  If your provider in the emergency department today gave you specific instructions to follow-up with your doctor or provider even sooner than that, you should follow that instruction and not wait for up to 4 weeks for your follow-up visit.        Thank you for  "choosing [facility]       Thank you for choosing [facility] for your care. Our goal is always to provide you with excellent care. Hearing back from our patients is one way we can continue to improve our services. Please take a few minutes to complete the written survey that you may receive in the mail after you visit with us. Thank you!        [employer] Information     [employer] lets you send messages to your doctor, view your test results, renew your prescriptions, schedule appointments and more. To sign up, go to [email or web address] . Click on \"Log in\" on the left side of the screen, which will take you to the Welcome page. Then click on \"Sign up Now\" on the right side of the page.     You will be asked to enter the access code listed below, as well as some personal information. Please follow the directions to create your username and password.     Your access code is: [email or web address]  Expires: 2018 12:37 AM     Your access code will  in 90 days. If you need help or a new code, please call your [facility] clinic or [phone].        Care EveryWhere ID     This is your Care EveryWhere ID. This could be used by other organizations to access your [facility] medical records  [id number]        Equal Access to Services     VICTOR HUGO DESAI : Carolin Galvan, wadannyda neva, qaybta kaalmada max, marvin rodriguez. So [facility] [phone].    ATENCIÓN: Si habla español, tiene a gonzalez disposición servicios gratuitos de asistencia lingüística. Llame al [phone].    We comply with applicable federal civil rights laws and [place] laws. We do not discriminate on the basis of race, color, national origin, age, disability, sex, sexual orientation, or gender identity.            After Visit Summary       This is your record. Keep this with you and show to your community pharmacist(s) and doctor(s) at your next visit.                  "

## 2018-08-08 VITALS
DIASTOLIC BLOOD PRESSURE: 86 MMHG | SYSTOLIC BLOOD PRESSURE: 141 MMHG | RESPIRATION RATE: 16 BRPM | TEMPERATURE: 96.7 F | OXYGEN SATURATION: 95 % | BODY MASS INDEX: 45.18 KG/M2 | HEART RATE: 93 BPM | HEIGHT: 69 IN | WEIGHT: 305 LBS

## 2018-08-08 RX ORDER — CEPHALEXIN 500 MG/1
500 CAPSULE ORAL 4 TIMES DAILY
Qty: 20 CAPSULE | Refills: 0 | Status: SHIPPED | OUTPATIENT
Start: 2018-08-08 | End: 2018-08-13

## 2018-08-08 RX ORDER — IBUPROFEN 200 MG
400 TABLET ORAL EVERY 8 HOURS PRN
Qty: 60 TABLET | Refills: 0 | Status: SHIPPED | OUTPATIENT
Start: 2018-08-08

## 2018-08-08 RX ORDER — HYDROCODONE BITARTRATE AND ACETAMINOPHEN 5; 325 MG/1; MG/1
1 TABLET ORAL EVERY 6 HOURS PRN
Qty: 10 TABLET | Refills: 0 | Status: SHIPPED | OUTPATIENT
Start: 2018-08-08 | End: 2023-03-07

## 2018-08-08 NOTE — ED PROVIDER NOTES
"  History     Chief Complaint:  Knee Pain    HPI   Romeo Gomez is an otherwise healthy 27 year old male who presents to the emergency department today for evaluation of left knee pain. The patient reports today when driving home from work, he started to experiencing left knee pain while sitting. He never felt a \"pop\". He is a  and is on his knees all day. He was able to take a shower, but was unable to lay on the bed. He was concerned about the pain with increased swelling and redness prompting his visit to the emergency department. He denies fevers, history of gout, pain medication use    Allergies:  No Known Drug Allergies    Medications:    The patient is currently on no regular medications.    Past Medical History:    Anxiety  Obesity    Past Surgical History:    History reviewed. No pertinent surgical history.    Family History:    Kidney disease  Heart disease    Social History:  The patient was accompanied to the ED by family.  Smoking Status: Former, 0.25 ppd  Smokeless Tobacco: Never  Alcohol Use: No  Marital Status:  Single    Review of Systems   Constitutional: Negative for fever.   Musculoskeletal: Positive for arthralgias and joint swelling.   Skin: Positive for color change.   All other systems reviewed and are negative.    Physical Exam     Patient Vitals for the past 24 hrs:   BP Temp Temp src Pulse Resp SpO2 Height Weight   08/07/18 2342 - - - - - 100 % - -   08/07/18 2337 (!) 132/94 - - 97 - - - -   08/07/18 2335 - 96.7  F (35.9  C) Temporal 85 20 - 1.753 m (5' 9\") 138.3 kg (305 lb)         Physical Exam  General: Patient is alert and interactive when I enter the room  Head:  The scalp, face, and head appear normal  Eyes:  Conjunctivae are normal  ENT:    The nose is normal    Pinnae are normal    External acoustic canals are normal  Neck:  Trachea midline  CV:  Pulses are normal    Resp:  No respiratory distress   Abdomen:      Soft, non-tender, non-distended  Musc:  Normal " muscular tone    Large effusion to anterior surface of left knee, warm to touch, tender to light touch, no erythema, abrasions to anterior surface of knee, limited ROM secondary to pain of anterior effusion     Good capillary refill noted  Skin:  No rash or lesions noted  Neuro:  Speech is normal and fluent. Face is symmetric.     Moving all extremities well.   Psych: Awake. Alert.  Normal affect.  Appropriate interactions.    Emergency Department Course   Imaging:  Radiology findings were communicated with the patient and family who voiced understanding of the findings.  XR Knee Left 3 views  IMPRESSION: No acute fracture or dislocation.  Report per radiology      Emergency Department Course:  Nursing notes and vitals reviewed.  The patient was sent for a XR Knee Left 3 views while in the emergency department, results above.   2341: I performed an exam of the patient as documented above.   0029: Patient rechecked and updated.   Findings and plan explained to the Patient and family. Patient discharged home with instructions regarding supportive care, medications, and reasons to return. The importance of close follow-up was reviewed. The patient was prescribed Keflex, norco, and ibuprofen.   I personally reviewed the imaging results with the Patient and family and answered all related questions prior to discharge.    Impression & Plan    Medical Decision Making:  Romeo Gomez is a 27 year old male who presents with left knee pain. Exam reveals a pre-patellar effusion. It is slightly warm to touch, however, no erythema appreciated and no obvious lacerations. However, he does have significant breaks in the skin of the anterior surfaces of both knees. I suspect that this is pre-patellar bursitis, however, with the warmth and significant tenderness, he could have septic bursitis. X-ray revealed no acute fractures. I think that it would be reasonable to start him off as septic bursitis, so we will start him on  Keflex and continue ibuprofen and pain control. We will placed him in a knee immobilizer as well. He was given orthopedics follow up. However, if he were to get fevers or worsening pain, he should return to the ER or contact orthopedics. Patient was comfortable with this plan. I doubt septic arthritis given the pain and the location of the effusion is all pre-patellar. Of note, he is a  that is at increased risk as he is on his knees quite frequently, so he is at risk for bursitis. Patient discharged with close return precautions.     Diagnosis:    ICD-10-CM    1. Septic prepatellar bursitis of left knee M71.162        Disposition:  discharged to home    Discharge Medications:  New Prescriptions    CEPHALEXIN (KEFLEX) 500 MG CAPSULE    Take 1 capsule (500 mg) by mouth 4 times daily for 5 days    HYDROCODONE-ACETAMINOPHEN (NORCO) 5-325 MG PER TABLET    Take 1 tablet by mouth every 6 hours as needed for severe pain    IBUPROFEN (ADVIL/MOTRIN) 200 MG TABLET    Take 2 tablets (400 mg) by mouth every 8 hours as needed for pain       Scribe Disclosure:  I, Francis Crews, am serving as a scribe at 11:41 PM on 8/7/2018 to document services personally performed by Radha Negrete MD based on my observations and the provider's statements to me.     8/7/2018   [facility] EMERGENCY DEPARTMENT       Radha Negrete MD  08/08/18 2031

## 2018-08-08 NOTE — DISCHARGE INSTRUCTIONS
Bursitis  You have bursitis. This is an inflammation of the bursa. These are small, fluid-filled sacs that surround the larger joints of the body. The bursa help the muscles and tendons move smoothly over the joints.  Bursitis often happens in the shoulder. But it can also affect the elbows, hips, pelvis, knees, toes, and heels. Bursitis can be caused by injury, overuse of the joint, or infection of the bursa. Symptoms include pain and tenderness over a joint. Symptoms get worse with movement.  Bursitis is treated with an anti-inflammatory medicine and by resting the joint. More severe cases require injection of medicine directly into the bursa.    Home care    Rest the painful joint and protect it from movement. This will allow the inflammation to heal faster.    Apply an ice pack over the injured area for no more than 15 to 20 minutes. Do this every 3 to 6 hours for the first 24 to 48 hours. Keep using ice packs 3 to 4 times a day until the pain and swelling improves.     To make an ice pack, put ice cubes in a sealed plastic zip-lock bag. Wrap the bag in a clean, thin towel or cloth. Never put ice or an ice pack directly on the skin. As the ice melts, be careful to avoid getting any wrap or splint wet.    You may take over-the-counter pain medicine to treat pain and inflammation, unless another medicine was prescribed. Anti-inflammatory pain medicines may be more effective. Talk with your provider beforeusing these medicines if you have chronic liver or kidney disease, or ever had a stomach ulcer or GI (gastrointestinal) bleeding.    As your symptoms improve, slowly begin to move the joint. Do not overuse the joint. This may cause the symptoms to flare up again.  When to seek medical advice  Call your healthcare provider right away if any of these occur:    Redness over the painful area    Increasing pain or swelling at the joint    Fever of 100.4 F (38 C) or above lasting for 24 to 48 hours  Date Last  Reviewed: 11/21/2015 2000-2017 [employer]. [address] [place] [address], [place] [address]. All rights reserved. This information is not intended as a substitute for professional medical care. Always follow your healthcare professional's instructions.

## 2018-11-30 ENCOUNTER — HOSPITAL ENCOUNTER (EMERGENCY)
Facility: CLINIC | Age: 28
Discharge: HOME | End: 2018-12-01
Attending: EMERGENCY MEDICINE | Admitting: EMERGENCY MEDICINE

## 2018-11-30 DIAGNOSIS — H93.8X2 EAR SWELLING, LEFT: ICD-10-CM

## 2018-11-30 DIAGNOSIS — R60.0 EDEMA OF FACE: ICD-10-CM

## 2018-11-30 DIAGNOSIS — R60.9 PAROTID SWELLING: ICD-10-CM

## 2018-11-30 LAB
ANION GAP SERPL CALCULATED.3IONS-SCNC: 5 MMOL/L (ref 3–14)
BASOPHILS # BLD AUTO: 0.1 10E9/L (ref 0–0.2)
BASOPHILS NFR BLD AUTO: 0.6 %
BUN SERPL-MCNC: 16 MG/DL (ref 7–30)
CALCIUM SERPL-MCNC: 8.6 MG/DL (ref 8.5–10.1)
CHLORIDE SERPL-SCNC: 105 MMOL/L (ref 94–109)
CO2 SERPL-SCNC: 28 MMOL/L (ref 20–32)
CREAT SERPL-MCNC: 0.81 MG/DL (ref 0.66–1.25)
DIFFERENTIAL METHOD BLD: NORMAL
EOSINOPHIL # BLD AUTO: 0.2 10E9/L (ref 0–0.7)
EOSINOPHIL NFR BLD AUTO: 2.8 %
ERYTHROCYTE [DISTWIDTH] IN BLOOD BY AUTOMATED COUNT: 12.6 % (ref 10–15)
GFR SERPL CREATININE-BSD FRML MDRD: >90 ML/MIN/1.7M2
GLUCOSE SERPL-MCNC: 106 MG/DL (ref 70–99)
HCT VFR BLD AUTO: 41.9 % (ref 40–53)
HETEROPH AB SER QL: NEGATIVE
HGB BLD-MCNC: 14.5 G/DL (ref 13.3–17.7)
IMM GRANULOCYTES # BLD: 0 10E9/L (ref 0–0.4)
IMM GRANULOCYTES NFR BLD: 0.1 %
LYMPHOCYTES # BLD AUTO: 3.1 10E9/L (ref 0.8–5.3)
LYMPHOCYTES NFR BLD AUTO: 39.1 %
MCH RBC QN AUTO: 29.4 PG (ref 26.5–33)
MCHC RBC AUTO-ENTMCNC: 34.6 G/DL (ref 31.5–36.5)
MCV RBC AUTO: 85 FL (ref 78–100)
MONOCYTES # BLD AUTO: 1 10E9/L (ref 0–1.3)
MONOCYTES NFR BLD AUTO: 12.1 %
NEUTROPHILS # BLD AUTO: 3.6 10E9/L (ref 1.6–8.3)
NEUTROPHILS NFR BLD AUTO: 45.3 %
NRBC # BLD AUTO: 0 10*3/UL
NRBC BLD AUTO-RTO: 0 /100
PLATELET # BLD AUTO: 355 10E9/L (ref 150–450)
POTASSIUM SERPL-SCNC: 3.8 MMOL/L (ref 3.4–5.3)
RBC # BLD AUTO: 4.93 10E12/L (ref 4.4–5.9)
SODIUM SERPL-SCNC: 138 MMOL/L (ref 133–144)
WBC # BLD AUTO: 8 10E9/L (ref 4–11)

## 2018-11-30 PROCEDURE — 86308 HETEROPHILE ANTIBODY SCREEN: CPT | Performed by: EMERGENCY MEDICINE

## 2018-11-30 PROCEDURE — 40000957 ZZHCL STATISTIC MUMPS VIRUS PCR: Performed by: EMERGENCY MEDICINE

## 2018-11-30 PROCEDURE — 80048 BASIC METABOLIC PNL TOTAL CA: CPT | Performed by: EMERGENCY MEDICINE

## 2018-11-30 PROCEDURE — 99285 EMERGENCY DEPT VISIT HI MDM: CPT | Mod: 25

## 2018-11-30 PROCEDURE — 85025 COMPLETE CBC W/AUTO DIFF WBC: CPT | Performed by: EMERGENCY MEDICINE

## 2018-11-30 RX ORDER — HYDROCODONE BITARTRATE AND ACETAMINOPHEN 5; 325 MG/1; MG/1
1 TABLET ORAL EVERY 4 HOURS PRN
Qty: 15 TABLET | Refills: 0 | Status: SHIPPED | OUTPATIENT
Start: 2018-11-30 | End: 2023-03-07

## 2018-11-30 RX ORDER — CEPHALEXIN 500 MG/1
500 CAPSULE ORAL 3 TIMES DAILY
Qty: 21 CAPSULE | Refills: 0 | Status: SHIPPED | OUTPATIENT
Start: 2018-11-30 | End: 2018-12-01

## 2018-11-30 NOTE — ED AVS SNAPSHOT
[facility] Emergency Department    [address]    [facility] [address]    Phone:  [phone]    Fax:  [phone]                                       Romeo Gomez   MRN: [id number]    Department:  [facility] Emergency Department   Date of Visit:  11/30/2018           After Visit Summary Signature Page     I have received my discharge instructions, and my questions have been answered. I have discussed any challenges I see with this plan with the nurse or doctor.    ..........................................................................................................................................  Patient/Patient Representative Signature      ..........................................................................................................................................  Patient Representative Print Name and Relationship to Patient    ..................................................               ................................................  Date                                   Time    ..........................................................................................................................................  Reviewed by Signature/Title    ...................................................              ..............................................  Date                                               Time          22EPIC Rev 08/18

## 2018-11-30 NOTE — ED AVS SNAPSHOT
[facility] Emergency Department    [address] [facility] [place] [address]    Phone:  [phone]    Fax:  [phone]                                       Romeo Gmoez   MRN: [id number]    Department:  [facility] Emergency Department   Date of Visit:  11/30/2018           Patient Information     Date Of Birth          1990        Your diagnoses for this visit were:     Edema of face     Parotid swelling     Ear swelling, left        You were seen by Tammy Cuevas MD and Jed Butterfield MD.      Follow-up Information     Schedule an appointment as soon as possible for a visit with Reuben Apodaca MD.    Specialty:  Otolaryngology    Contact information:    [facility]  [address]  [facility] [address] [phone]          Follow up with [facility] Emergency Department.    Specialty:  EMERGENCY MEDICINE    Why:  If symptoms worsen    Contact information:    [address] [facility] [address] [phone]        Discharge Instructions         Facial Cellulitis  Cellulitis is an infection of the deep layers of skin. A break in the skin, such as a cut or scratch, can let bacteria under the skin. It may also occur from an infected oil gland (pimple) or hair follicle. If the bacteria get to deep layers of the skin, it can be serious. If not treated, cellulitis can get into the bloodstream and lymph nodes. The infection can then spread throughout the body. This causes serious illness.  Cellulitis causes the affected skin to become red, swollen, warm, and sore. The reddened areas have a visible border. You may have a fever, chills, and pain.  Cellulitis is treated with antibiotics taken for 7 to 10 days. Symptoms should get better 1 to 2 days after treatment is started. Make sure to take all the antibiotics for the full number of days until they are gone. Keep taking the medication even if your symptoms go  away.  Home care  Follow these tips:    Take all of the antibiotic medicine exactly as directed until it is gone. Don t miss any doses, especially during the first 7 days. Don t stop taking it when your symptoms get better.    Use a cool compress (face cloth soaked in cool water) on your face to help reduce swelling and pain.    You may use acetaminophen or ibuprofen to reduce pain. Don t use these if you have chronic liver or kidney disease, or ever had a stomach ulcer or gastrointestinal bleeding. Talk with your healthcare provider first.  Follow-up care  Follow up with your healthcare provider, or as advised. If your infection does not go away on the first antibiotic, your healthcare provider will prescribe a different one.  When to seek medical advice  Call your healthcare provider right away if any of these occur:    Fever higher of 100.4  F (38.0  C) or higher after 2 days on antibiotics    Red areas that spread    Swelling or pain that gets worse    Fluid leaking from the skin (pus)    An eyelid that swells shut or leaks fluid (pus)    Headache or neck pain that gets worse    Unusual drowsiness or confusion    Convulsions (seizure)    Change in eyesight     Date Last Reviewed: 9/1/2016 2000-2018 [employer]. [address] [place] [address]. All rights reserved. This information is not intended as a substitute for professional medical care. Always follow your healthcare professional's instructions.          24 Hour Appointment Hotline       To make an appointment at any [facility] clinic, call [phone] ([phone]). If you don't have a family doctor or clinic, we will help you find one. [facility] clinics are conveniently located to serve the needs of you and your family.             Review of your medicines      START taking        Dose / Directions Last dose taken    amoxicillin-clavulanate 875-125 MG tablet   Commonly known as:  AUGMENTIN   Dose:  1 tablet   Quantity:  20  tablet        Take 1 tablet by mouth 2 times daily for 10 days   Refills:  0          Our records show that you are taking the medicines listed below. If these are incorrect, please call your family doctor or clinic.        Dose / Directions Last dose taken    ibuprofen 200 MG tablet   Commonly known as:  ADVIL/MOTRIN   Dose:  400 mg   Quantity:  60 tablet        Take 2 tablets (400 mg) by mouth every 8 hours as needed for pain   Refills:  0        * order for DME   Quantity:  1 Device        Equipment being ordered: left knee brace   Refills:  0        * order for DME   Quantity:  1 Device        Equipment being ordered: left thumb spica   Refills:  0        * Notice:  This list has 2 medication(s) that are the same as other medications prescribed for you. Read the directions carefully, and ask your doctor or other care provider to review them with you.      ASK your doctor about these medications        Dose / Directions Last dose taken    * HYDROcodone-acetaminophen 5-325 MG tablet   Commonly known as:  NORCO   Dose:  1 tablet   What changed:  Another medication with the same name was added. Make sure you understand how and when to take each.   Quantity:  10 tablet   Ask about: Which instructions should I use?        Take 1 tablet by mouth every 6 hours as needed for severe pain   Refills:  0        * HYDROcodone-acetaminophen 5-325 MG tablet   Commonly known as:  NORCO   Dose:  1 tablet   What changed:  You were already taking a medication with the same name, and this prescription was added. Make sure you understand how and when to take each.   Quantity:  15 tablet   Ask about: Which instructions should I use?        Take 1 tablet by mouth every 4 hours as needed for pain   Refills:  0        * Notice:  This list has 2 medication(s) that are the same as other medications prescribed for you. Read the directions carefully, and ask your doctor or other care provider to review them with you.            Information  about OPIOIDS     PRESCRIPTION OPIOIDS: WHAT YOU NEED TO KNOW   We gave you an opioid (narcotic) pain medicine. It is important to manage your pain, but opioids are not always the best choice. You should first try all the other options your care team gave you. Take this medicine for as short a time (and as few doses) as possible.    Some activities can increase your pain, such as bandage changes or therapy sessions. It may help to take your pain medicine 30 to 60 minutes before these activities. Reduce your stress by getting enough sleep, working on hobbies you enjoy and practicing relaxation or meditation. Talk to your care team about ways to manage your pain beyond prescription opioids.    These medicines have risks:    DO NOT drive when on new or higher doses of pain medicine. These medicines can affect your alertness and reaction times, and you could be arrested for driving under the influence (DUI). If you need to use opioids long-term, talk to your care team about driving.    DO NOT operate heavy machinery    DO NOT do any other dangerous activities while taking these medicines.    DO NOT drink any alcohol while taking these medicines.     If the opioid prescribed includes acetaminophen, DO NOT take with any other medicines that contain acetaminophen. Read all labels carefully. Look for the word  acetaminophen  or  Tylenol.  Ask your pharmacist if you have questions or are unsure.    You can get addicted to pain medicines, especially if you have a history of addiction (chemical, alcohol or substance dependence). Talk to your care team about ways to reduce this risk.    All opioids tend to cause constipation. Drink plenty of water and eat foods that have a lot of fiber, such as fruits, vegetables, prune juice, apple juice and high-fiber cereal. Take a laxative (Miralax, milk of magnesia, Colace, Senna) if you don t move your bowels at least every other day. Other side effects include upset stomach, sleepiness,  dizziness, throwing up, tolerance (needing more of the medicine to have the same effect), physical dependence and slowed breathing.    Store your pills in a secure place, locked if possible. We will not replace any lost or stolen medicine. If you don t finish your medicine, please throw away (dispose) as directed by your pharmacist. The [place] [employer] has more information about safe disposal: https://www.pca.state.[place]/living-green/managing-unwanted-medications        Prescriptions were sent or printed at these locations (2 Prescriptions)                   Other Prescriptions                Printed at Department/Unit printer (2 of 2)         amoxicillin-clavulanate (AUGMENTIN) 875-125 MG tablet               HYDROcodone-acetaminophen (NORCO) 5-325 MG tablet                Procedures and tests performed during your visit     Basic metabolic panel    CBC with platelets differential    Mononucleosis screen    Mumps Confirmation PCR    Peripheral IV catheter    Soft tissue neck CT w contrast      Orders Needing Specimen Collection     None      Pending Results     Date and Time Order Name Status Description    11/30/2018 2313 Soft tissue neck CT w contrast Preliminary     11/30/2018 2151 Mumps Confirmation PCR In process             Pending Culture Results     Date and Time Order Name Status Description    11/30/2018 2151 Mumps Confirmation PCR In process             Pending Results Instructions     If you had any lab results that were not finalized at the time of your Discharge, you can call the ED Lab Result RN at [phone]. You will be contacted by this team for any positive Lab results or changes in treatment. The nurses are available 7 days a week from 10A to 6:30P.  You can leave a message 24 hours per day and they will return your call.        Test Results From Your Hospital Stay        11/30/2018 10:29 PM      Component Results     Component Value Ref Range & Units Status    WBC 8.0 4.0 -  11.0 10e9/L Final    RBC Count 4.93 4.4 - 5.9 10e12/L Final    Hemoglobin 14.5 13.3 - 17.7 g/dL Final    Hematocrit 41.9 40.0 - 53.0 % Final    MCV 85 78 - 100 fl Final    MCH 29.4 26.5 - 33.0 pg Final    MCHC 34.6 31.5 - 36.5 g/dL Final    RDW 12.6 10.0 - 15.0 % Final    Platelet Count 355 150 - 450 10e9/L Final    Diff Method Automated Method  Final    % Neutrophils 45.3 % Final    % Lymphocytes 39.1 % Final    % Monocytes 12.1 % Final    % Eosinophils 2.8 % Final    % Basophils 0.6 % Final    % Immature Granulocytes 0.1 % Final    Nucleated RBCs 0 0 /100 Final    Absolute Neutrophil 3.6 1.6 - 8.3 10e9/L Final    Absolute Lymphocytes 3.1 0.8 - 5.3 10e9/L Final    Absolute Monocytes 1.0 0.0 - 1.3 10e9/L Final    Absolute Eosinophils 0.2 0.0 - 0.7 10e9/L Final    Absolute Basophils 0.1 0.0 - 0.2 10e9/L Final    Abs Immature Granulocytes 0.0 0 - 0.4 10e9/L Final    Absolute Nucleated RBC 0.0  Final         11/30/2018 10:44 PM      Component Results     Component Value Ref Range & Units Status    Sodium 138 133 - 144 mmol/L Final    Potassium 3.8 3.4 - 5.3 mmol/L Final    Chloride 105 94 - 109 mmol/L Final    Carbon Dioxide 28 20 - 32 mmol/L Final    Anion Gap 5 3 - 14 mmol/L Final    Glucose 106 (H) 70 - 99 mg/dL Final    Urea Nitrogen 16 7 - 30 mg/dL Final    Creatinine 0.81 0.66 - 1.25 mg/dL Final    GFR Estimate >90 >60 mL/min/1.7m2 Final    Non  GFR Calc    GFR Estimate If Black >90 >60 mL/min/1.7m2 Final    African American GFR Calc    Calcium 8.6 8.5 - 10.1 mg/dL Final               11/30/2018 10:15 PM         11/30/2018 10:49 PM      Component Results     Component Value Ref Range & Units Status    Mononucleosis Screen Negative NEG^Negative Final         12/1/2018 12:34 AM      Narrative     CT SOFT TISSUE NECK W CONTRAST  12/1/2018 12:18 AM      HISTORY: Left parotid and periauricular swelling and tenderness.    TECHNIQUE: CT neck with intravenous contrast. Radiation dose for this  scan was  reduced using automated exposure control, adjustment of the  mA and/or kV according to patient size, or iterative reconstruction  technique. 80 mL Isovue-370.     COMPARISON: None.    FINDINGS: There is mild soft tissue edema over the left parotid gland  and inferior aspect of the left ear. Mild thickening of fascial planes  in the left neck. No focal fluid collection to suggest abscess. Small  lymph nodes in the neck bilaterally. No lymph node enlargement. No  abnormal mass. Soft tissues of the neck are otherwise symmetric.  Orbital contents appear normal. The visualized paranasal sinuses are  clear. Mastoid air cells are clear.        Impression     IMPRESSION: Mild soft tissue edema over the left parotid gland and  left neck. No focal abscess.                Clinical Quality Measure: Blood Pressure Screening     Your blood pressure was checked while you were in the emergency department today. The last reading we obtained was  BP: 118/69 . Please read the guidelines below about what these numbers mean and what you should do about them.  If your systolic blood pressure (the top number) is less than 120 and your diastolic blood pressure (the bottom number) is less than 80, then your blood pressure is normal. There is nothing more that you need to do about it.  If your systolic blood pressure (the top number) is 120-139 or your diastolic blood pressure (the bottom number) is 80-89, your blood pressure may be higher than it should be. You should have your blood pressure rechecked within a year by a primary care provider.  If your systolic blood pressure (the top number) is 140 or greater or your diastolic blood pressure (the bottom number) is 90 or greater, you may have high blood pressure. High blood pressure is treatable, but if left untreated over time it can put you at risk for heart attack, stroke, or kidney failure. You should have your blood pressure rechecked by a primary care provider within the next 4  "weeks.  If your provider in the emergency department today gave you specific instructions to follow-up with your doctor or provider even sooner than that, you should follow that instruction and not wait for up to 4 weeks for your follow-up visit.        Thank you for choosing [facility]       Thank you for choosing [facility] for your care. Our goal is always to provide you with excellent care. Hearing back from our patients is one way we can continue to improve our services. Please take a few minutes to complete the written survey that you may receive in the mail after you visit with us. Thank you!        [facility] Information     [facility] lets you send messages to your doctor, view your test results, renew your prescriptions, schedule appointments and more. To sign up, go to [email or web address] . Click on \"Log in\" on the left side of the screen, which will take you to the Welcome page. Then click on \"Sign up Now\" on the right side of the page.     You will be asked to enter the access code listed below, as well as some personal information. Please follow the directions to create your username and password.     Your access code is: [email or web address]  Expires: 3/1/2019  1:12 AM     Your access code will  in 90 days. If you need help or a new code, please call your [facility] clinic or [phone].        Care EveryWhere ID     This is your Care EveryWhere ID. This could be used by other organizations to access your [facility] medical records  [id number]        Equal Access to Services     VICTOR HUGO DESAI : Hadii chery Galvan, waaxda neva, qaybta kaalmada max, marvin rodriguez. So [facility] [phone].    ATENCIÓN: Si habla español, tiene a gonzalez disposición servicios gratuitos de asistencia lingüística. Llame al [phone].    We comply with applicable federal civil rights laws and [place] laws. We do not discriminate on the basis of race, color, national origin, age, " disability, sex, sexual orientation, or gender identity.            After Visit Summary       This is your record. Keep this with you and show to your community pharmacist(s) and doctor(s) at your next visit.

## 2018-12-01 ENCOUNTER — APPOINTMENT (OUTPATIENT)
Dept: CT IMAGING | Facility: CLINIC | Age: 28
End: 2018-12-01
Attending: EMERGENCY MEDICINE

## 2018-12-01 VITALS
RESPIRATION RATE: 12 BRPM | BODY MASS INDEX: 41.35 KG/M2 | SYSTOLIC BLOOD PRESSURE: 118 MMHG | TEMPERATURE: 98.3 F | HEART RATE: 67 BPM | OXYGEN SATURATION: 100 % | WEIGHT: 280 LBS | DIASTOLIC BLOOD PRESSURE: 69 MMHG

## 2018-12-01 PROCEDURE — 70491 CT SOFT TISSUE NECK W/DYE: CPT

## 2018-12-01 PROCEDURE — 25000128 H RX IP 250 OP 636: Performed by: EMERGENCY MEDICINE

## 2018-12-01 RX ORDER — IOPAMIDOL 755 MG/ML
500 INJECTION, SOLUTION INTRAVASCULAR ONCE
Status: COMPLETED | OUTPATIENT
Start: 2018-12-01 | End: 2018-12-01

## 2018-12-01 RX ADMIN — IOPAMIDOL 80 ML: 755 INJECTION, SOLUTION INTRAVENOUS at 00:10

## 2018-12-01 RX ADMIN — SODIUM CHLORIDE 65 ML: 9 INJECTION, SOLUTION INTRAVENOUS at 00:10

## 2018-12-01 NOTE — DISCHARGE INSTRUCTIONS
Facial Cellulitis  Cellulitis is an infection of the deep layers of skin. A break in the skin, such as a cut or scratch, can let bacteria under the skin. It may also occur from an infected oil gland (pimple) or hair follicle. If the bacteria get to deep layers of the skin, it can be serious. If not treated, cellulitis can get into the bloodstream and lymph nodes. The infection can then spread throughout the body. This causes serious illness.  Cellulitis causes the affected skin to become red, swollen, warm, and sore. The reddened areas have a visible border. You may have a fever, chills, and pain.  Cellulitis is treated with antibiotics taken for 7 to 10 days. Symptoms should get better 1 to 2 days after treatment is started. Make sure to take all the antibiotics for the full number of days until they are gone. Keep taking the medication even if your symptoms go away.  Home care  Follow these tips:    Take all of the antibiotic medicine exactly as directed until it is gone. Don t miss any doses, especially during the first 7 days. Don t stop taking it when your symptoms get better.    Use a cool compress (face cloth soaked in cool water) on your face to help reduce swelling and pain.    You may use acetaminophen or ibuprofen to reduce pain. Don t use these if you have chronic liver or kidney disease, or ever had a stomach ulcer or gastrointestinal bleeding. Talk with your healthcare provider first.  Follow-up care  Follow up with your healthcare provider, or as advised. If your infection does not go away on the first antibiotic, your healthcare provider will prescribe a different one.  When to seek medical advice  Call your healthcare provider right away if any of these occur:    Fever higher of 100.4  F (38.0  C) or higher after 2 days on antibiotics    Red areas that spread    Swelling or pain that gets worse    Fluid leaking from the skin (pus)    An eyelid that swells shut or leaks fluid (pus)    Headache or  neck pain that gets worse    Unusual drowsiness or confusion    Convulsions (seizure)    Change in eyesight     Date Last Reviewed: 9/1/2016 2000-2018 [employer]. [address] [facility], [place] [address]. All rights reserved. This information is not intended as a substitute for professional medical care. Always follow your healthcare professional's instructions.

## 2018-12-01 NOTE — ED PROVIDER NOTES
Patient is a 28-year-old male who presents with swelling to the left side of his face.  The patient was signed out to me by Dr. Cuevas.  Please see her initial ED provider notes for history of present illness, medical decision making, and physical exam.  Patient ultimately had a negative mononucleosis screen and thus was sent for a CT scan of the soft tissue of the neck.  There is mild soft tissue edema over the left parotid gland and left neck but no focal abscess.  With no focal abscess concerned that this may represent early cellulitis or potential infection.  We will treat with appropriate antibiotics for oral mariza coverage with Augmentin for the next 10 days.  I also referred the patient to otolaryngology for close outpatient follow-up in the next 72 hours for recheck.  He will return to the emergency department if he develops fever, difficulty swallowing or breathing, or any worsening symptoms to the left side of his face.  Dr. Cuevas had written for some pain medication for home use as well.  All questions were answered prior to discharge.  Strict return precautions understood before discharge.  Discharged home.    Soft tissue neck CT w contrast   Preliminary Result   IMPRESSION: Mild soft tissue edema over the left parotid gland and   left neck. No focal abscess.             Jed Butterfield MD  12/01/18 0110

## 2018-12-01 NOTE — ED PROVIDER NOTES
Visit Date:   2018      ADDENDUM      This is a 28-year-old gentleman who presents with swelling to the left side of the face (and earlobe) with no fevers, no known dental pain and normal swallowing.  On examination, he does have slight cervical lymphadenopathy on the left side (see pictoral). No obvious dental abscess.  An IV was placed and labs were sent.  His CBC is completely normal.  BNP is normal.  Mumps was collected by me and is pending and his  mono is negative.  CT scan was ordered and is still pending.  The plan is to likely treat the patient with antibiotics.  There is some redness on the left cheek that extends up into the left side of his earlobe and I think the plan at this point would be to treat him with antibiotics.  There is no evidence of airway swelling.     Clinically facial cellulitis.     DISPOSITION:  Home.         ALTON PARNELL MD             D: 2018   T: 2018   MT: MEHRAN      Name:     FREDERIC GARNICALASHON   MRN:      [id number]        Account:      [id number]   :      1990           Visit Date:   2018      Document: [id number]

## 2018-12-01 NOTE — ED PROVIDER NOTES
History     Chief Complaint:    swelling of left ear lobe and face      HPI   Romeo Gomez is a 28 year old male who presents with left face and earlobe pain.  Patient states he is a  and Monday had swelling of the left earlobe.  Chills.  Over the week has had swelling just inferior to the left ear.  No diabetes or difficulty swallowing.  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  No dental pain or fevers.  Ne noted swelling that started earlier this week and became worse the past 24 hours.      Allergies:    No Known Allergies     Medications:        HYDROcodone-acetaminophen (NORCO) 5-325 MG per tablet   ibuprofen (ADVIL/MOTRIN) 200 MG tablet   order for DME   order for DME       Problem List:      Patient Active Problem List    Diagnosis Date Noted     Anxiety 11/20/2017     Priority: Medium     Morbid obesity due to excess calories (H) 10/11/2016     Priority: Medium     Reactive hypoglycemia 09/03/2016     Priority: Medium     Adjustment disorder with mixed anxiety and depressed mood 09/03/2016     Priority: Medium     Body mass index 40.0-44.9, adult (H) 04/15/2016     Priority: Medium        Past Medical History:      Past Medical History:   Diagnosis Date     Anxiety      Elevated LFTs 4/2016     Obesity        Past Surgical History:      No past surgical history on file.    Family History:      Family History   Problem Relation Age of Onset     KIDNEY DISEASE Father      Heart Disease Father        Social History:    Marital Status:  Single [1]  Social History   Substance Use Topics     Smoking status: Former Smoker     Packs/day: 0.25     Smokeless tobacco: Never Used     Alcohol use No        Review of Systems  All other ROS reviewed and negative.  Fevers or chills.  No difficulty swallowing.  Patient denies any trauma to the left side of his face.  No fevers.  Normal eating.      Physical Exam   First Vitals:  BP: (!) 129/93  Pulse: 67  Heart Rate: 67  Temp:  98.3  F (36.8  C)  Resp: 18  Weight: 127 kg (280 lb)  SpO2: 99 %      Physical Exam   HENT:   Head:         GEN: patient smiling, no distress  HEAD: atraumatic, normocephalic  EYES: pupils reactive (3plus to 2plus), extraocular muscles intact, conjunctivae normal  ENT: TMs flat and white bilaterally, oropharynx normal with no erythema or exudate, mucus membranes moist, floor of mouth is soft, midface stable, intraoral cavity is negative, no gumline fluctuance  NECK: no anterior cervical LAD? Inferior to left ear ?posterioir cervical LAD  RESPIRATORY: no tachypnea, breath sounds clear to auscultation (no rales, wheezes, rhonchi), chest wall nontender, normal phonation  CVS: normal S1/S2, no murmurs/rubs/gallops  ABDOMEN: soft, nontender, no masses or organomegaly, no rebound, positive bowel sounds  BACK: no costovertebral angle tenderness  EXTREMITIES: intact pulses x 2 (radial pulses intact), no edema  MUSCULOSKELETAL: no deformities  SKIN: warm and dry, no acute rashes, left ear red ear lobe but other pinna or cartialge redness  NEURO: symmetrical exam  HEME: no bruising or petechiae/contusions  LYMPH: no lymphadenopathy      Emergency Department Course       Laboratory:  CBC with platelets differential (Final result) Component (Lab Inquiry)       Collection Time Result Time WBC RBC HGB HCT MCV     11/30/18 22:15:00 11/30/18 22:29:53 8.0 4.93 14.5 41.9 85          Collection Time Result Time MCH MCHC RDW PLT DIFF METHO     11/30/18 22:15:00 11/30/18 22:29:53 29.4 34.6 12.6 355 Automated Method          Collection Time Result Time % Neutrophils % Lymphocytes % Monocytes % Eosinophils % Basophils     11/30/18 22:15:00 11/30/18 22:29:53 45.3 39.1 12.1 2.8 0.6          Collection Time Result Time % Immature Granulocytes Nucleated RBCs Absolute Neutrophil Absolute Lymphocytes Absolute Monocytes     11/30/18 22:15:00 11/30/18 22:29:53 0.1 0 3.6 3.1 1.0          Collection Time Result Time Absolute Eosinophils Absolute  Basophils Abs Immature Granulocytes Absolute Nucleated RBC     11/30/18 22:15:00 11/30/18 22:29:53 0.2 0.1 0.0 0.0                      Basic metabolic panel (Final result)    Abnormal Component (Lab Inquiry)       Collection Time Result Time NA POTASSIUM Chloride Carbon Dioxide ANION GAP     11/30/18 22:15:00 11/30/18 22:44:27 138 3.8 105 28 5          Collection Time Result Time GLUCOSE BUN CREATININE GFR Estimate GFR Estimate If Black     11/30/18 22:15:00 11/30/18 22:44:27 106 (H) 16 0.81 >90  Non  GFR Calc >90   GFR Calc          Collection Time Result Time Calcium     11/30/18 22:15:00 11/30/18 22:44:27 8.6                      Mumps Confirmation PCR (In process) Result time: 11/30/18 22:15:34          Mononucleosis screen (In process)       Mumps Confirmation PCR (In process) Result time: 11/30/18 22:15:34          Mononucleosis screen (Edited Result - FINAL) Component (Lab Inquiry)       Collection Time Result Time MONO TEST     11/30/18 22:15:00 11/30/18 22:49:54 Negative                      Interventions:  [place]  Cardiac/Sp02 monitoring      Emergency Department Course:  10:24 PM Mumps swab taken  ED Course     Patient updated.    Impression & Plan       Medical Decision Making:    This is a 28-year-old gentleman who presents with swelling to the left side of his face.  See dictation    Diagnosis:  Left facial swelling/cellulitis    Disposition:  pending    Discharge Medications:  New Prescriptions    No medications on file         Tammy Cuevas  11/30/2018   [facility] EMERGENCY DEPARTMENT       Tammy Cuevas MD  12/01/18 1600

## 2018-12-04 LAB
MUMPS CONFIRMATION PCR: NORMAL
MUMPS SPECIMEN TYPE: NORMAL

## 2018-12-18 LAB
MUMPS CONFIRMATION PCR: NORMAL
MUMPS SPECIMEN TYPE: NORMAL

## 2019-11-28 ENCOUNTER — HOSPITAL ENCOUNTER (EMERGENCY)
Facility: CLINIC | Age: 29
Discharge: HOME | End: 2019-11-28
Attending: EMERGENCY MEDICINE | Admitting: EMERGENCY MEDICINE

## 2019-11-28 ENCOUNTER — APPOINTMENT (OUTPATIENT)
Dept: GENERAL RADIOLOGY | Facility: CLINIC | Age: 29
End: 2019-11-28
Attending: EMERGENCY MEDICINE

## 2019-11-28 VITALS — OXYGEN SATURATION: 98 % | SYSTOLIC BLOOD PRESSURE: 136 MMHG | DIASTOLIC BLOOD PRESSURE: 78 MMHG | TEMPERATURE: 97.5 F

## 2019-11-28 DIAGNOSIS — M25.562 ACUTE PAIN OF LEFT KNEE: ICD-10-CM

## 2019-11-28 PROCEDURE — 99285 EMERGENCY DEPT VISIT HI MDM: CPT

## 2019-11-28 PROCEDURE — 25000132 ZZH RX MED GY IP 250 OP 250 PS 637: Performed by: EMERGENCY MEDICINE

## 2019-11-28 PROCEDURE — 73562 X-RAY EXAM OF KNEE 3: CPT | Mod: LT

## 2019-11-28 RX ORDER — IBUPROFEN 600 MG/1
600 TABLET, FILM COATED ORAL ONCE
Status: COMPLETED | OUTPATIENT
Start: 2019-11-28 | End: 2019-11-28

## 2019-11-28 RX ORDER — ACETAMINOPHEN 325 MG/1
975 TABLET ORAL ONCE
Status: COMPLETED | OUTPATIENT
Start: 2019-11-28 | End: 2019-11-28

## 2019-11-28 RX ADMIN — ACETAMINOPHEN 975 MG: 325 TABLET, FILM COATED ORAL at 16:28

## 2019-11-28 RX ADMIN — IBUPROFEN 600 MG: 600 TABLET, FILM COATED ORAL at 16:28

## 2019-11-28 ASSESSMENT — ENCOUNTER SYMPTOMS
CHILLS: 0
NAUSEA: 0
FEVER: 0
JOINT SWELLING: 1
VOMITING: 0

## 2019-11-28 NOTE — ED PROVIDER NOTES
History     Chief Complaint:  knee pain     HPI   Romeo Gomez is a 29 year old male who presents with knee pain. The patient states that he has had pain in his left knee for over a month but it got worse yesterday. He also notes of some swelling in the left knee when he woke up today. The pain in the left knee is worse when he tries to go up and down stairs.  The patient reports that he was a  and is on his knee a lot. He denies having pain like this before. He denies any falls or injuries. He also notes of some pain to the medial ankle when walking. He denies any fever, chills, nausea, or vomiting.       Allergies:  No Known Allergies     Medications:    The patient is not currently taking any prescribed medications.      Past Medical History:    Anxiety     Past Surgical History:    The patient does not have any pertinent past surgical history.     Family History:    Kidney disease   Heart disease     Social History:  The patient was accompanied to the ED by family member.  Smoking Status: former smoker  Smokeless Tobacco: Never Used  Alcohol Use: no   Drug Use: No  PCP: No Ref-Primary, Physician   Marital Status:  Single [1]    Review of Systems   Constitutional: Negative for chills and fever.   Gastrointestinal: Negative for nausea and vomiting.   Musculoskeletal: Positive for joint swelling (left knee).        Left knee pain   All other systems reviewed and are negative.      Physical Exam     Patient Vitals for the past 24 hrs:   BP Temp Temp src Heart Rate SpO2   11/28/19 1715 136/78 -- -- -- 98 %   11/28/19 1612 127/67 97.5  F (36.4  C) Oral 73 100 %       Physical Exam  General: Patient is alert, awake and interactive  Head: The scalp, face, and head appear normal  Eyes: Conjunctivae are normal  ENT: The nose is normal, Pinnae are normal, External acoustic canals are normal  Neck: Trachea midline  CV: Pulses are normal.   Resp: No respiratory distress   Musc: Normal muscular tone,  moving all extremities.  Left knee exam:   Inspection: edema to the superior knee, above the patella   Palpation: TTP over the prepatella bursa   ROM:   Able to actively flex/ext knee.  Sensation: Intact to light touch distally  Cap refil:   < 2 seconds  DP/PT Pulses normal.  Left  hip: full flex/ext w/o pain, no ttp.  Left ankle/foot: Able to flex/ext toes and DF/PF ankle actively. No TTP.  Special Tests:   Lachman: negative   Posterior drawer: negative  Varus stress test: negative   Valgus stress test: negative   Skin: No rash or lesions noted  Neuro:  Speech is normal and fluent. Face is symmetric.   Psych: Normal affect.  Appropriate interactions.        Emergency Department Course     Imaging:  Radiology findings were communicated with the patient who voiced understanding of the findings.    XR Knee Left 3 Views  Probable small to moderate-sized joint effusion.  Otherwise negative left knee x-rays. No fracture or malalignment.    JASMIN HERNANDEZ MD  Reading per radiology     Interventions:  1628 Ibuprofen 600 mg oral   1628 Tylenol 975 mg oral     Emergency Department Course:  Past medical records, nursing notes, and vitals reviewed.    1617 I performed an exam of the patient as documented above.       1725 Patient rechecked and updated.       Findings and plan explained to the Patient. Patient discharged home with instructions regarding supportive care, medications, and reasons to return. The importance of close follow-up was reviewed.      Impression & Plan     Medical Decision Making:  Romeo Gomez is a 29 year old male who presents to the emergency department today with left knee pain.  Patient's presentation and history is most consistent with bursitis.  X-ray negative.  There is no significant warmth, erythema or significant pain with active or passive range of motion that would make me consider a septic arthritis.  At this point I do not feel that he requires an arthrocentesis.  Patient will use  ibuprofen and Tylenol for pain control.  We will follow-up with orthopedics.  All questions were answered patient be discharged home in stable condition.      Discharge Diagnosis:    ICD-10-CM    1. Acute pain of left knee M25.562        Disposition:  The patient is discharged to home.    Scribe Disclosure:  IArvind, am serving as a scribe at 4:17 PM on 11/28/2019 to document services personally performed by Kirt Jimenez MD based on my observations and the provider's statements to me.    11/28/2019   [facility] EMERGENCY DEPARTMENT       Kirt Jimenez MD  11/28/19 1763

## 2019-11-28 NOTE — ED AVS SNAPSHOT
[facility] Emergency Department  [address]  [facility] [address]  Phone:  [phone]  Fax:  [phone]                                    Romeo Gomez   MRN: [id number]    Department:  [facility] Emergency Department   Date of Visit:  11/28/2019           After Visit Summary Signature Page    I have received my discharge instructions, and my questions have been answered. I have discussed any challenges I see with this plan with the nurse or doctor.    ..........................................................................................................................................  Patient/Patient Representative Signature      ..........................................................................................................................................  Patient Representative Print Name and Relationship to Patient    ..................................................               ................................................  Date                                   Time    ..........................................................................................................................................  Reviewed by Signature/Title    ...................................................              ..............................................  Date                                               Time          22EPIC Rev 08/18

## 2019-11-28 NOTE — ED TRIAGE NOTES
Patient presents to ED due to L knee pain denies injuries. Reports using ICY hot without relief.     Worse with movement . Increased swelling to lateral knee. Denies fever or rash .

## 2019-12-30 ENCOUNTER — HOSPITAL ENCOUNTER (EMERGENCY)
Facility: CLINIC | Age: 29
Discharge: HOME | End: 2019-12-30
Attending: NURSE PRACTITIONER | Admitting: NURSE PRACTITIONER

## 2019-12-30 VITALS
RESPIRATION RATE: 16 BRPM | WEIGHT: 285 LBS | TEMPERATURE: 98.6 F | OXYGEN SATURATION: 100 % | DIASTOLIC BLOOD PRESSURE: 80 MMHG | HEIGHT: 69 IN | BODY MASS INDEX: 42.21 KG/M2 | SYSTOLIC BLOOD PRESSURE: 131 MMHG

## 2019-12-30 DIAGNOSIS — J11.1 INFLUENZA-LIKE ILLNESS: ICD-10-CM

## 2019-12-30 PROCEDURE — 99283 EMERGENCY DEPT VISIT LOW MDM: CPT

## 2019-12-30 ASSESSMENT — ENCOUNTER SYMPTOMS
DYSURIA: 0
SHORTNESS OF BREATH: 0
DIZZINESS: 1
CHILLS: 1
VOMITING: 0
SORE THROAT: 1
HEADACHES: 1
FEVER: 1
NAUSEA: 0

## 2019-12-30 ASSESSMENT — MIFFLIN-ST. JEOR: SCORE: 2248.13

## 2019-12-30 NOTE — ED TRIAGE NOTES
Pt presents for evaluation of cough with fevers and lightheaded starting today. Exposed to influenza. Took tylenol around 2 hrs ago.

## 2019-12-30 NOTE — ED AVS SNAPSHOT
[facility] Emergency Department  [address]  [facility] [address]  Phone:  [phone]  Fax:  [phone]                                    Romeo Julian   MRN: [id number]    Department:  [facility] Emergency Department   Date of Visit:  12/30/2019           After Visit Summary Signature Page    I have received my discharge instructions, and my questions have been answered. I have discussed any challenges I see with this plan with the nurse or doctor.    ..........................................................................................................................................  Patient/Patient Representative Signature      ..........................................................................................................................................  Patient Representative Print Name and Relationship to Patient    ..................................................               ................................................  Date                                   Time    ..........................................................................................................................................  Reviewed by Signature/Title    ...................................................              ..............................................  Date                                               Time          22EPIC Rev 08/18

## 2019-12-30 NOTE — ED PROVIDER NOTES
"  History     Chief Complaint:    Influenza like illness      The history is provided by the patient.      Romeo Julian is a 29 year old male who presents with a subjective fever, chills, lightheaded, and sore throat that onset earlier today. He did have a headache that dissipated after taking Tylenol.  The patient denies ear pain. His wife was positive for influenza B yesterday evening. The patient did not receive his flu shot.     Allergies:  The patient has no known drug allergies.    Medications:    The patient is currently on no regular medications.    Past Medical History:    Morbid Obesity   Anxiety  Adjustment disorder with mixed anxiety and depressed mood   Elevated LFTs    Past Surgical History:    The patient does not have any pertinent past surgical history.    Family History:    Kidney disease   Heart disease    Social History:  Former smoker  Negative for alcohol use.  Negative for drug use.  Marital Status:  Single [1]     Review of Systems   Constitutional: Positive for chills and fever.   HENT: Positive for sore throat. Negative for ear pain.    Respiratory: Negative for shortness of breath.    Gastrointestinal: Negative for nausea and vomiting.   Genitourinary: Negative for dysuria.   Neurological: Positive for dizziness and headaches.   All other systems reviewed and are negative.      Physical Exam     Patient Vitals for the past 24 hrs:   BP Temp Temp src Heart Rate Resp SpO2 Height Weight   12/30/19 1727 131/80 98.6  F (37  C) Temporal 94 16 100 % 1.753 m (5' 9\") 129.3 kg (285 lb)       Physical Exam    General:  Alert, No obvious discomfort, well kept   HENT:   Normal voice, Normal oropharnyx, No lymphadenopathy,Normal TM bilaterally, clear rhinorrhea  Eyes: The pupils are equal, round, and reactive to light, Conjunctiva normal, No scleral icterus   Neck: Normal range of motion, No menigismus  CV:  Normal Pulses  Resp: Non-labored,  [place], [place]zing, crackles, or rales  MS:  Normal " muscular tone, moves all extremities  Skin:  No rash or acute skin lesions noted  Neuro:  Speech is normal and fluent  Psych:  Awake. Alert.  Normal affect.  Appropriate interactions. Good eye contact    Emergency Department Course     Emergency Department Course:  Past medical records, nursing notes, and vitals reviewed.    1823: I performed an exam of the patient as documented above.      Findings and plan explained to the patient. Patient discharged home with instructions regarding supportive care, medications, and reasons to return. The importance of close follow-up was reviewed.    I personally answered all related questions prior to discharge.    Impression & Plan     Medical Decision Making:  Romeo Julian is a 29 year old male presents for evaluation of upper respiratory symptoms. Patient is concerned for influenza. Evaluation consisted of Physical exam. Non specific viral findings possibly influenza. Exam consistent with viral illness. No evidence of sepsis. No meningismus. Xray not indicated. Discharged with advice for symptomatic treatment including over the counter medication such as Tylenol and Ibuprofen. Advised to follow up with primary care provider in 5-7 days if continued symptoms, sooner if worsening. Patient will return to the ER/UR if they develop high fevers not controlled with medication, difficulty breathing, shortness of breath, or has other concerns.       Discharge Diagnosis:    ICD-10-CM    1. Influenza-like illness R69      Disposition:  Discharged home.     Scribe Disclosure:  I, Miladis Islas, am serving as a scribe at 6:23 PM on 12/30/2019 to document services personally performed by Omari Temple APRN based on my observations and the provider's statements to me.   12/30/2019   [facility] EMERGENCY DEPARTMENT       Omari Temple APRN CNP  12/30/19 1844

## 2019-12-31 NOTE — DISCHARGE INSTRUCTIONS

## 2020-08-25 ENCOUNTER — OFFICE VISIT (OUTPATIENT)
Dept: FAMILY MEDICINE | Facility: CLINIC | Age: 30
End: 2020-08-25

## 2020-08-25 ENCOUNTER — ANCILLARY PROCEDURE (OUTPATIENT)
Dept: GENERAL RADIOLOGY | Facility: CLINIC | Age: 30
End: 2020-08-25
Attending: NURSE PRACTITIONER

## 2020-08-25 VITALS
RESPIRATION RATE: 16 BRPM | OXYGEN SATURATION: 98 % | WEIGHT: 279 LBS | HEIGHT: 69 IN | TEMPERATURE: 97.6 F | HEART RATE: 73 BPM | SYSTOLIC BLOOD PRESSURE: 115 MMHG | DIASTOLIC BLOOD PRESSURE: 74 MMHG | BODY MASS INDEX: 41.32 KG/M2

## 2020-08-25 DIAGNOSIS — M25.561 CHRONIC PAIN OF BOTH KNEES: Primary | ICD-10-CM

## 2020-08-25 DIAGNOSIS — G89.29 CHRONIC PAIN OF BOTH KNEES: Primary | ICD-10-CM

## 2020-08-25 DIAGNOSIS — M25.562 CHRONIC PAIN OF BOTH KNEES: Primary | ICD-10-CM

## 2020-08-25 PROCEDURE — 90715 TDAP VACCINE 7 YRS/> IM: CPT | Performed by: NURSE PRACTITIONER

## 2020-08-25 PROCEDURE — 73565 X-RAY EXAM OF KNEES: CPT

## 2020-08-25 PROCEDURE — 99203 OFFICE O/P NEW LOW 30 MIN: CPT | Mod: 25 | Performed by: NURSE PRACTITIONER

## 2020-08-25 PROCEDURE — 90471 IMMUNIZATION ADMIN: CPT | Performed by: NURSE PRACTITIONER

## 2020-08-25 ASSESSMENT — MIFFLIN-ST. JEOR: SCORE: 2220.92

## 2020-08-25 NOTE — PROGRESS NOTES
"Subjective     Romeo Julian is a 29 year old male who presents to clinic today for the following health issues:    HPI       Musculoskeletal problem/pain  Onset/Duration: Ongoing over a year   Description  Location: knee - L >R  Joint Swelling: YES L >R  Redness: no  Pain: YES  Warmth: YES  Intensity:  moderate  Progression of Symptoms:  Worsening over past year. Was intermittent in past, becoming more consistent.   Accompanying signs and symptoms:   Fevers: no  Numbness/tingling/weakness: no  History  Trauma to the area: no  Recent illness:  no  Previous similar problem: YES the pain is ongoing over a year   Previous evaluation:  no  Precipitating or alleviating factors:  Aggravating factors include: climbing stairs and being on the knee. Patient states bones in the knee cracks sometimes   Therapies tried and outcome: Ice intermittently with no benefits.     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       Review of Systems   Constitutional, HEENT, cardiovascular, pulmonary, gi and gu systems are negative, except as otherwise noted.      Objective    /74 (BP Location: Right arm, Patient Position: Chair, Cuff Size: Adult Large)   Pulse 73   Temp 97.6  F (36.4  C) (Oral)   Resp 16   Ht 1.753 m (5' 9\")   Wt 126.6 kg (279 lb)   SpO2 98%   BMI 41.20 kg/m    Body mass index is 41.2 kg/m .  Physical Exam   GENERAL: healthy, alert and no distress  RESP: Regular rate and efforts.   MS: RLE exam shows normal strength and muscle mass, no erythema, induration, or nodules, no evidence of joint instability and LLE exam shows normal strength and muscle mass, no erythema, induration, or nodules and prepatellar effusion.   SKIN: no suspicious lesions or rashes    Xray - Reviewed. No acute fracture or dislocation on X-ray. Radiology read pending.           Assessment & Plan     Romeo was seen today for musculoskeletal problem.    Diagnoses and all orders for this visit:    Chronic pain of both " "knees  -     XR Knee AP Standing Bilateral  -     Orthopedic & Spine  Referral  Progressive knee pain. No acute concerns for septic joint with no erythema or warmth. No notable joint space narrowing for concerns of OA. Discussed Physical Therapy and patient opts for Orthopedics referral. Will defer aspiration of L knee effusion until seen by Orthopedics for long term planning.   RICE therapy with NSAIDs recommended. Weight loss recommended.     Other orders  -     TDAP, IM (10 - 64 YRS) - Adacel  -     REVIEW OF HEALTH MAINTENANCE PROTOCOL ORDERS     BMI:   Estimated body mass index is 41.2 kg/m  as calculated from the following:    Height as of this encounter: 1.753 m (5' 9\").    Weight as of this encounter: 126.6 kg (279 lb).   Weight management plan: Discussed healthy diet and exercise guidelines    Patient Instructions   Alternate Tylenol and ibuprofen every 8 hours.    Ace bandage to wrap left knee.     Use knee pads at work ... always.     Ice knees when home after work.     Consult Orthopedics.          SINTIA Moyer [facility]"

## 2020-08-25 NOTE — PROGRESS NOTES
Prior to immunization administration, verified patients identity using patient s name and date of birth. Please see Immunization Activity for additional information.     Screening Questionnaire for Adult Immunization    Are you sick today?   No   Do you have allergies to medications, food, a vaccine component or latex?   No   Have you ever had a serious reaction after receiving a vaccination?   No   Do you have a long-term health problem with heart, lung, kidney, or metabolic disease (e.g., diabetes), asthma, a blood disorder, no spleen, complement component deficiency, a cochlear implant, or a spinal fluid leak?  Are you on long-term aspirin therapy?   No   Do you have cancer, leukemia, HIV/AIDS, or any other immune system problem?   No   Do you have a parent, brother, or sister with an immune system problem?   No   In the past 3 months, have you taken medications that affect  your immune system, such as prednisone, other steroids, or anticancer drugs; drugs for the treatment of rheumatoid arthritis, Crohn s disease, or psoriasis; or have you had radiation treatments?   No   Have you had a seizure, or a brain or other nervous system problem?   Yes   During the past year, have you received a transfusion of blood or blood    products, or been given immune (gamma) globulin or antiviral drug?   No   For women: Are you pregnant or is there a chance you could become       pregnant during the next month?   No   Have you received any vaccinations in the past 4 weeks?   No     Immunization questionnaire was positive for at least one answer.  Notified Cindy Morales NP.        Per orders of Cindy Morales NP, injection of Tdap given by Maria Luz Chaudhry MA. Patient instructed to remain in clinic for 15 minutes afterwards, and to report any adverse reaction to me immediately.       Screening performed by Maria Luz Chaudhry MA on 8/25/2020 at 12:53 PM.

## 2020-08-25 NOTE — PATIENT INSTRUCTIONS
Alternate Tylenol and ibuprofen every 8 hours.    Ace bandage to wrap left knee.     Use knee pads at work ... always.     Ice knees when home after work.     Consult Orthopedics.

## 2020-08-26 ENCOUNTER — TELEPHONE (OUTPATIENT)
Dept: FAMILY MEDICINE | Facility: CLINIC | Age: 30
End: 2020-08-26

## 2020-08-26 NOTE — TELEPHONE ENCOUNTER
----- Message from SINTIA Moyer CNP sent at 8/26/2020  8:55 AM CDT -----  Team - please call patient with normal results on Knee X-ray per radiologist. Follow-up with Orthopedics as planned.   Thank you  SINTIA Moyer CNP on 8/26/2020 at 8:54 AM

## 2020-08-26 NOTE — TELEPHONE ENCOUNTER
Patient called back and read the providers message. Patient stated understanding the message and will f/u with orthopedics.

## 2020-08-26 NOTE — TELEPHONE ENCOUNTER
LVM for pt to call back to the clinic and ask to speak to someone at the [facility].  Bethany Cintron MA on 8/26/2020 at 9:43 AM

## 2021-04-27 NOTE — DISCHARGE INSTRUCTIONS
Discharge Instructions  Cellulitis    Cellulitis is an infection of the skin that occurs when bacteria enter the skin.   Symptoms are generally redness, swelling, warmth and pain.  Your infection appeared to be appropriate to treat at home with antibiotics.  However, sometimes your infection may be worse than it seemed at first, or may worsen with time. If you have new or worse symptoms, you may need to be seen again in the Emergency Department or by your primary doctor.    Return to the Emergency Department if:    The redness, pain, or swelling gets a lot worse.  If the red area was marked, return if it is red beyond the marked area.    You are unable to get your antibiotics, or are vomiting them up or you can t take them.    You are feeling more ill, weak or lightheaded.    You start to run a new fever (temperature >101).    Anything else about the infection worries or concerns you.  Treatment:    Start your antibiotics right away, and take them as prescribed. Be sure to finish the whole prescription, even if you are better.    Apply a heating pad, warm packs, or warm water soaks to the infected area for 15 minutes at a time, at least 3 times a day. Do not use a heating pad on your feet or legs if you have diabetes. Do not sleep with a heating pad on, since this can cause burns or skin injury.    Rest your injured area for at least 1-2 days. After that you may start using your extremity again as long as there is not too much pain.     Raise the injured area above the level of your heart as much as possible in the first 1-2 days.    Tylenol  (acetaminophen), Motrin  (ibuprofen), or Advil  (ibuprofen) may help may help reduce pain and fever and may help you feel more comfortable. Be sure to read and follow the package directions, and ask your doctor if you have questions.    Follow-up with your doctor:    Re-check in clinic within 2-3 days.  Probiotics: If you have been given an antibiotic, you may want to also  -- DO NOT REPLY / DO NOT REPLY ALL --  -- Message is from the [facility]--    COVID-19 Universal Screening: N/A - Not about scheduling    General Patient Message      Reason for Call: Patient needs the Covid Results and a clearance letter faxed to the school nurse at [phone] Attn: Ольга Harding    Caller Information       Type Contact Phone    04/27/2021 07:43 AM CDT Phone (Incoming) Estefany (Mother) [phone]          Alternative phone number: [phone]        Did the caller agree that this message can wait until the office reopens in the morning? YES - The Message Can Wait      Send a message to the provider’s clinical support pool.     Turnaround time given to caller:   \"This message will be sent to [state Provider's name]. The clinical team will fulfill your request as soon as they review your message when the office opens tomorrow.\"         "take a probiotic pill or eat yogurt with live cultures. Probiotics have \"good bacteria\" to help your intestines stay healthy. Studies have shown that probiotics help prevent diarrhea and other intestine problems (including C. diff infection) when you take antibiotics. You can buy these without a prescription in the pharmacy section of the store.     If you were given a prescription for medicine here today, be sure to read all of the information (including the package insert) that comes with your prescription.  This will include important information about the medicine, its side effects, and any warnings that you need to know about.  The pharmacist who fills the prescription can provide more information and answer questions you may have about the medicine.  If you have questions or concerns that the pharmacist cannot address, please call or return to the Emergency Department.     Opioid Medication Information    Pain medications are among the most commonly prescribed medicines, so we are including this information for all our patients. If you did not receive pain medication or get a prescription for pain medicine, you can ignore it.     You may have been given a prescription for an opioid (narcotic) pain medicine and/or have received a pain medicine while here in the Emergency Department. These medicines can make you drowsy or impaired. You must not drive, operate dangerous equipment, or engage in any other dangerous activities while taking these medications. If you drive while taking these medications, you could be arrested for DUI, or driving under the influence. Do not drink any alcohol while you are taking these medications.     Opioid pain medications can cause addiction. If you have a history of chemical dependency of any type, you are at a higher risk of becoming addicted to pain medications.  Only take these prescribed medications to treat your pain when all other options have been tried. Take it for as short " a time and as few doses as possible. Store your pain pills in a secure place, as they are frequently stolen and provide a dangerous opportunity for children or visitors in your house to start abusing these powerful medications. We will not replace any lost or stolen medicine.  As soon as your pain is better, you should flush all your remaining medication.     Many prescription pain medications contain Tylenol  (acetaminophen), including Vicodin , Tylenol #3 , Norco , Lortab , and Percocet .  You should not take any extra pills of Tylenol  if you are using these prescription medications or you can get very sick.  Do not ever take more than 3000 mg of acetaminophen in any 24 hour period.    All opioids tend to cause constipation. Drink plenty of water and eat foods that have a lot of fiber, such as fruits, vegetables, prune juice, apple juice and high fiber cereal.  Take a laxative if you don t move your bowels at least every other day. Miralax , Milk of Magnesia, Colace , or Senna  can be used to keep you regular.      Remember that you can always come back to the Emergency Department if you are not able to see your regular doctor in the amount of time listed above, if you get any new symptoms, or if there is anything that worries you.

## 2023-03-07 ENCOUNTER — OFFICE VISIT (OUTPATIENT)
Dept: FAMILY MEDICINE | Facility: CLINIC | Age: 33
End: 2023-03-07

## 2023-03-07 VITALS
DIASTOLIC BLOOD PRESSURE: 72 MMHG | SYSTOLIC BLOOD PRESSURE: 124 MMHG | HEIGHT: 68 IN | HEART RATE: 63 BPM | TEMPERATURE: 97.1 F | RESPIRATION RATE: 18 BRPM | OXYGEN SATURATION: 98 % | BODY MASS INDEX: 45.97 KG/M2 | WEIGHT: 303.3 LBS

## 2023-03-07 DIAGNOSIS — M19.90 ARTHRITIS: Primary | ICD-10-CM

## 2023-03-07 DIAGNOSIS — E66.01 OBESITY, CLASS III, BMI 40-49.9 (MORBID OBESITY) (H): ICD-10-CM

## 2023-03-07 LAB — HBA1C MFR BLD: 6.2 % (ref 0–5.6)

## 2023-03-07 PROCEDURE — 81374 HLA I TYPING 1 ANTIGEN LR: CPT | Performed by: FAMILY MEDICINE

## 2023-03-07 PROCEDURE — 80053 COMPREHEN METABOLIC PANEL: CPT | Performed by: FAMILY MEDICINE

## 2023-03-07 PROCEDURE — 84550 ASSAY OF BLOOD/URIC ACID: CPT | Performed by: FAMILY MEDICINE

## 2023-03-07 PROCEDURE — 83721 ASSAY OF BLOOD LIPOPROTEIN: CPT | Mod: 59 | Performed by: FAMILY MEDICINE

## 2023-03-07 PROCEDURE — 83036 HEMOGLOBIN GLYCOSYLATED A1C: CPT | Performed by: FAMILY MEDICINE

## 2023-03-07 PROCEDURE — 99214 OFFICE O/P EST MOD 30 MIN: CPT | Performed by: FAMILY MEDICINE

## 2023-03-07 PROCEDURE — 36415 COLL VENOUS BLD VENIPUNCTURE: CPT | Performed by: FAMILY MEDICINE

## 2023-03-07 PROCEDURE — 86038 ANTINUCLEAR ANTIBODIES: CPT | Performed by: FAMILY MEDICINE

## 2023-03-07 PROCEDURE — 86200 CCP ANTIBODY: CPT | Performed by: FAMILY MEDICINE

## 2023-03-07 PROCEDURE — 86039 ANTINUCLEAR ANTIBODIES (ANA): CPT | Performed by: FAMILY MEDICINE

## 2023-03-07 PROCEDURE — 80061 LIPID PANEL: CPT | Performed by: FAMILY MEDICINE

## 2023-03-07 RX ORDER — CELECOXIB 100 MG/1
100 CAPSULE ORAL 2 TIMES DAILY
Qty: 60 CAPSULE | Refills: 1 | Status: SHIPPED | OUTPATIENT
Start: 2023-03-07 | End: 2024-03-29

## 2023-03-07 ASSESSMENT — ENCOUNTER SYMPTOMS
FEVER: 0
ARTHRALGIAS: 1

## 2023-03-07 NOTE — PROGRESS NOTES
Assessment & Plan     Arthritis  Chronic, controlled, previously had evaluation for the similar issue, I did review bilateral previous knee x-rays.  It is possible he may have some internal derangement with a locking catching could be underlying meniscal tear.  Patient is quite concerned about having gout, history of alcohol use disorder.  He does have some quite noticeable psoriatic patches and I think we need other lab testing to rule out psoriatic arthritis.  She was initially given Norco by previous provider which he discarded.  Not pain seeking.  Would recommend starting him on Celebrex; and I will follow-up with patient in 3 weeks, consider intra-articular left knee corticosteroid injection  - Comprehensive metabolic panel (BMP + Alb, Alk Phos, ALT, AST, Total. Bili, TP)  - HLA-B27 Typing  - Cyclic Citrullinated Peptide Antibody IgG  - Anti Nuclear Michelle IgG by IFA with Reflex  - Uric acid  - celecoxib (CELEBREX) 100 MG capsule  Dispense: 60 capsule; Refill: 1  - Comprehensive metabolic panel (BMP + Alb, Alk Phos, ALT, AST, Total. Bili, TP)  - HLA-B27 Typing  - Cyclic Citrullinated Peptide Antibody IgG  - Anti Nuclear Michelle IgG by IFA with Reflex  - Uric acid    Obesity, Class III, BMI 40-49.9 (morbid obesity) (H)  Screen for diabetes, check lipid profile  - Hemoglobin A1c  - Lipid panel reflex to direct LDL Non-fasting  - Hemoglobin A1c  - Lipid panel reflex to direct LDL Non-fasting        Return in about 3 weeks (around 3/28/2023) for chronic pain + possible injection. .    Madhav Romano MD  [facility] FEI Walters is a 32 year old accompanied by his spouse, presenting for the following health issues:  Musculoskeletal Problem (Back and knee pain)      Musculoskeletal Problem  Associated symptoms include arthralgias. Pertinent negatives include no fever.   History of Present Illness       Back Pain:  He presents for follow up of back pain. Patient's back pain is a chronic  "problem.  Location of back pain:  Left lower back and left buttock  Description of back pain: cramping and sharp  Back pain spreads: left buttocks, left knee and left foot    Since patient first noticed back pain, pain is: always present, but gets better and worse  Does back pain interfere with his job:  Yes      Reason for visit:  Knee pain,back pain  Symptom onset:  More than a month  Symptoms include:  Knee numbness, inflamation  Symptom intensity:  Moderate  Symptom progression:  Staying the same  Had these symptoms before:  No  What makes it worse:  Standing for to long  What makes it better:  Nothing    He eats 2-3 servings of fruits and vegetables daily.He consumes 2 sweetened beverage(s) daily.He exercises with enough effort to increase his heart rate 9 or less minutes per day.  He exercises with enough effort to increase his heart rate 3 or less days per week.   He is taking medications regularly.       Patient is a 32-year-old male presents with ongoing low back to left knee pain.  Has evaluated for this in the past.  He is also noticed more eczema on his arms and head.    Left knee occasionally catches and gets stuck.    No recent injury.  Was working as a contractor.      Review of Systems   Constitutional: Negative for fever.   Musculoskeletal: Positive for arthralgias.            Objective    /72   Pulse 63   Temp 97.1  F (36.2  C) (Tympanic)   Resp 18   Ht 1.727 m (5' 8\")   Wt 137.6 kg (303 lb 4.8 oz)   SpO2 98%   BMI 46.12 kg/m    Body mass index is 46.12 kg/m .  Physical Exam  Vitals reviewed.   Constitutional:       Appearance: He is obese. He is not ill-appearing.   Cardiovascular:      Rate and Rhythm: Normal rate and regular rhythm.   Pulmonary:      Effort: Pulmonary effort is normal.      Breath sounds: Normal breath sounds.   Musculoskeletal:      Comments: Left knee, full range of motion, negative anterior posterior drawer sign, negative Wicho.  No popliteal masses   Skin:    "  Comments: Large dry scaly patches on the extensor surfaces of both forearms            No results found for this or any previous visit (from the past 24 hour(s)).

## 2023-03-08 LAB
ALBUMIN SERPL BCG-MCNC: 4.9 G/DL (ref 3.5–5.2)
ALP SERPL-CCNC: 96 U/L (ref 40–129)
ALT SERPL W P-5'-P-CCNC: 96 U/L (ref 10–50)
ANION GAP SERPL CALCULATED.3IONS-SCNC: 15 MMOL/L (ref 7–15)
AST SERPL W P-5'-P-CCNC: 64 U/L (ref 10–50)
BILIRUB SERPL-MCNC: 0.6 MG/DL
BUN SERPL-MCNC: 15.6 MG/DL (ref 6–20)
CALCIUM SERPL-MCNC: 9.7 MG/DL (ref 8.6–10)
CHLORIDE SERPL-SCNC: 102 MMOL/L (ref 98–107)
CHOLEST SERPL-MCNC: 245 MG/DL
CREAT SERPL-MCNC: 0.8 MG/DL (ref 0.67–1.17)
DEPRECATED HCO3 PLAS-SCNC: 22 MMOL/L (ref 22–29)
GFR SERPL CREATININE-BSD FRML MDRD: >90 ML/MIN/1.73M2
GLUCOSE SERPL-MCNC: 91 MG/DL (ref 70–99)
HDLC SERPL-MCNC: 33 MG/DL
LDLC SERPL CALC-MCNC: ABNORMAL MG/DL
LDLC SERPL DIRECT ASSAY-MCNC: 143 MG/DL
NONHDLC SERPL-MCNC: 212 MG/DL
POTASSIUM SERPL-SCNC: 4.2 MMOL/L (ref 3.4–5.3)
PROT SERPL-MCNC: 8.2 G/DL (ref 6.4–8.3)
SODIUM SERPL-SCNC: 139 MMOL/L (ref 136–145)
TRIGL SERPL-MCNC: 556 MG/DL
URATE SERPL-MCNC: 6 MG/DL (ref 3.4–7)

## 2023-03-09 LAB
ANA PAT SER IF-IMP: ABNORMAL
ANA SER QL IF: ABNORMAL
ANA TITR SER IF: ABNORMAL {TITER}
CCP AB SER IA-ACNC: 1.8 U/ML

## 2023-03-10 LAB
B LOCUS: NORMAL
B27TEST METHOD: NORMAL

## 2023-03-30 ENCOUNTER — OFFICE VISIT (OUTPATIENT)
Dept: FAMILY MEDICINE | Facility: CLINIC | Age: 33
End: 2023-03-30

## 2023-03-30 VITALS
RESPIRATION RATE: 16 BRPM | BODY MASS INDEX: 44.56 KG/M2 | HEART RATE: 98 BPM | SYSTOLIC BLOOD PRESSURE: 125 MMHG | TEMPERATURE: 98.2 F | HEIGHT: 68 IN | WEIGHT: 294 LBS | OXYGEN SATURATION: 97 % | DIASTOLIC BLOOD PRESSURE: 80 MMHG

## 2023-03-30 DIAGNOSIS — R73.03 PREDIABETES: ICD-10-CM

## 2023-03-30 DIAGNOSIS — M62.838 MUSCLE SPASM: Primary | ICD-10-CM

## 2023-03-30 PROCEDURE — 99213 OFFICE O/P EST LOW 20 MIN: CPT | Performed by: FAMILY MEDICINE

## 2023-03-30 RX ORDER — METHYLPREDNISOLONE 4 MG
TABLET, DOSE PACK ORAL
Qty: 21 TABLET | Refills: 0 | Status: SHIPPED | OUTPATIENT
Start: 2023-04-01 | End: 2024-03-29

## 2023-03-30 RX ORDER — CYCLOBENZAPRINE HCL 5 MG
5 TABLET ORAL
Qty: 30 TABLET | Refills: 3 | Status: SHIPPED | OUTPATIENT
Start: 2023-03-30 | End: 2024-03-29

## 2023-03-30 ASSESSMENT — ANXIETY QUESTIONNAIRES
GAD7 TOTAL SCORE: 0
6. BECOMING EASILY ANNOYED OR IRRITABLE: NOT AT ALL
8. IF YOU CHECKED OFF ANY PROBLEMS, HOW DIFFICULT HAVE THESE MADE IT FOR YOU TO DO YOUR WORK, TAKE CARE OF THINGS AT HOME, OR GET ALONG WITH OTHER PEOPLE?: NOT DIFFICULT AT ALL
5. BEING SO RESTLESS THAT IT IS HARD TO SIT STILL: NOT AT ALL
7. FEELING AFRAID AS IF SOMETHING AWFUL MIGHT HAPPEN: NOT AT ALL
GAD7 TOTAL SCORE: 0
1. FEELING NERVOUS, ANXIOUS, OR ON EDGE: NOT AT ALL
3. WORRYING TOO MUCH ABOUT DIFFERENT THINGS: NOT AT ALL
4. TROUBLE RELAXING: NOT AT ALL
7. FEELING AFRAID AS IF SOMETHING AWFUL MIGHT HAPPEN: NOT AT ALL
GAD7 TOTAL SCORE: 0
IF YOU CHECKED OFF ANY PROBLEMS ON THIS QUESTIONNAIRE, HOW DIFFICULT HAVE THESE PROBLEMS MADE IT FOR YOU TO DO YOUR WORK, TAKE CARE OF THINGS AT HOME, OR GET ALONG WITH OTHER PEOPLE: NOT DIFFICULT AT ALL
2. NOT BEING ABLE TO STOP OR CONTROL WORRYING: NOT AT ALL

## 2023-03-30 ASSESSMENT — PATIENT HEALTH QUESTIONNAIRE - PHQ9
SUM OF ALL RESPONSES TO PHQ QUESTIONS 1-9: 0
SUM OF ALL RESPONSES TO PHQ QUESTIONS 1-9: 0
10. IF YOU CHECKED OFF ANY PROBLEMS, HOW DIFFICULT HAVE THESE PROBLEMS MADE IT FOR YOU TO DO YOUR WORK, TAKE CARE OF THINGS AT HOME, OR GET ALONG WITH OTHER PEOPLE: NOT DIFFICULT AT ALL

## 2023-03-30 ASSESSMENT — ENCOUNTER SYMPTOMS: BACK PAIN: 1

## 2023-03-30 NOTE — PROGRESS NOTES
Assessment & Plan     Muscle spasm  Uncontrolled, recommend Flexeril, start Medrol Dosepak.  Patient currently is self-insured, when he has insurance I recommend we pursue imaging including x-ray given the chronicity of his symptoms  - methylPREDNISolone (MEDROL DOSEPAK) 4 MG tablet therapy pack  Dispense: 21 tablet; Refill: 0  - cyclobenzaprine (FLEXERIL) 5 MG tablet  Dispense: 30 tablet; Refill: 3    Prediabetes  Start metformin, continue lifestyle changes.  - metFORMIN (GLUCOPHAGE) 500 MG tablet  Dispense: 90 tablet; Refill: 3          Madhav Romano MD  [facility] FEI Walters is a 32 year old, presenting for the following health issues:  Back Pain  No flowsheet data found.  History of Present Illness       Back Pain:  He presents for follow up of back pain. Patient's back pain is a chronic problem.  Location of back pain:  Left lower back, left middle of back, left buttock and left side of waist  Description of back pain: sharp  Back pain spreads: left foot    Since patient first noticed back pain, pain is: unchanged  Does back pain interfere with his job:  Yes      Reason for visit:  Follow up    He eats 2-3 servings of fruits and vegetables daily.He consumes 1 sweetened beverage(s) daily.He exercises with enough effort to increase his heart rate 30 to 60 minutes per day.  He exercises with enough effort to increase his heart rate 4 days per week.   He is taking medications regularly.    Today's PHQ-9         PHQ-9 Total Score: 0    PHQ-9 Q9 Thoughts of better off dead/self-harm past 2 weeks :   Not at all    How difficult have these problems made it for you to do your work, take care of things at home, or get along with other people: Not difficult at all  Today's RIA-7 Score: 0       Patient is a pleasant 32-year-old male who recently establish care with me and presents for follow-up of back pain.  Left lower back occasionally continues to get tight.      Review of Systems  "  Musculoskeletal: Positive for back pain.            Objective    /80 (BP Location: Right arm, Patient Position: Chair, Cuff Size: Adult Large)   Pulse 98   Temp 98.2  F (36.8  C) (Oral)   Resp 16   Ht 1.727 m (5' 8\")   Wt 133.4 kg (294 lb)   SpO2 97%   BMI 44.70 kg/m    Body mass index is 44.7 kg/m .  Physical Exam  Cardiovascular:      Rate and Rhythm: Normal rate and regular rhythm.   Pulmonary:      Effort: Pulmonary effort is normal.      Breath sounds: Normal breath sounds.   Musculoskeletal:         General: Tenderness (Left lower paraspinal tenderness) present.                    "

## 2023-04-29 ENCOUNTER — HEALTH MAINTENANCE LETTER (OUTPATIENT)
Age: 33
End: 2023-04-29
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
## 2024-03-29 ENCOUNTER — OFFICE VISIT (OUTPATIENT)
Dept: FAMILY MEDICINE | Facility: CLINIC | Age: 34
End: 2024-03-29

## 2024-03-29 VITALS
RESPIRATION RATE: 18 BRPM | BODY MASS INDEX: 41.25 KG/M2 | TEMPERATURE: 99 F | HEART RATE: 65 BPM | SYSTOLIC BLOOD PRESSURE: 138 MMHG | HEIGHT: 69 IN | OXYGEN SATURATION: 97 % | WEIGHT: 278.5 LBS | DIASTOLIC BLOOD PRESSURE: 82 MMHG

## 2024-03-29 DIAGNOSIS — G89.29 CHRONIC PAIN OF BOTH KNEES: Primary | ICD-10-CM

## 2024-03-29 DIAGNOSIS — M25.562 CHRONIC PAIN OF BOTH KNEES: Primary | ICD-10-CM

## 2024-03-29 DIAGNOSIS — L40.9 PSORIASIS: ICD-10-CM

## 2024-03-29 DIAGNOSIS — M25.561 CHRONIC PAIN OF BOTH KNEES: Primary | ICD-10-CM

## 2024-03-29 PROCEDURE — 99213 OFFICE O/P EST LOW 20 MIN: CPT | Performed by: GENERAL PRACTICE

## 2024-03-29 RX ORDER — CLOBETASOL PROPIONATE 0.5 MG/G
OINTMENT TOPICAL 2 TIMES DAILY
Qty: 60 G | Refills: 1 | Status: SHIPPED | OUTPATIENT
Start: 2024-03-29

## 2024-03-29 ASSESSMENT — PAIN SCALES - GENERAL: PAINLEVEL: EXTREME PAIN (8)

## 2024-03-29 NOTE — LETTER
March 29, 2024      Romeo Julian  [id number] [place] [address]        To Whom It May Concern:    Romeo Julian  was seen on 3/29/24.  Please excuse him for 1 month due to illness.        Sincerely,        Kirsten Navarro MD

## 2024-03-29 NOTE — PROGRESS NOTES
"  Assessment & Plan     Chronic pain of both knees  -Wrote work letter for 1 month and extend by ortho if indicated  - XR Knee Bilateral 1/2 Views; Future  - Orthopedic  Referral; Future, for possible aspiration of joint    Psoriasis    - clobetasol (TEMOVATE) 0.05 % external ointment; Apply topically 2 times daily Apply twice daily to arms and legs for 2 weeks  -Follow-up with PCP and possible referral to rheumatology for options          BMI  Estimated body mass index is 41.13 kg/m  as calculated from the following:    Height as of this encounter: 1.753 m (5' 9\").    Weight as of this encounter: 126.3 kg (278 lb 8 oz).             Janna Walters is a 33 year old, presenting for the following health issues:  Knee Pain (Both knees)      3/29/2024     3:45 PM   Additional Questions   Roomed by Yvonne TORREZ   Accompanied by anahy, Partner   Failed to redirect to the Timeline version of the [employer] SmartLink.    Knee Pain     Started with one knee pain, now both knees are painful   Unable to stand for more than 30 minutes due to  pain    Psoriasis on arms and legs.     History of Present Illness       Reason for visit:  Knee pain    He eats 2-3 servings of fruits and vegetables daily.He consumes 1 sweetened beverage(s) daily.He exercises with enough effort to increase his heart rate 60 or more minutes per day.  He exercises with enough effort to increase his heart rate 5 days per week.   He is taking medications regularly.         Pain History:  When did you first notice your pain? About 2 year brianna   Have you seen this provider for your pain in the past? No   Where in your body do your have pain? Both knees  Are you seeing anyone else for your pain? Yes -   What makes your pain better? nothing  What makes your pain worse? Standing for long periods of time  How has pain affected your ability to work? 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 Wife and 3 kids                Chronic Pain - Initial Assessment:    How would you describe your pain? Sharp   Have you had any recent changes to the severity or character of your pain? no  Is there an underlying cause that has been identified? Possible arthritis   Has your ability to work or do daily activities changed recently because of your pain? Yes, hard time walking   Which of these pain treatments have you tried? Exersize, icing, physical therapy.  Previous medication treatments: over the counter pain meds            Review of Systems  Constitutional, neuro, ENT, endocrine, pulmonary, cardiac, gastrointestinal, genitourinary, musculoskeletal, integument and psychiatric systems are negative, except as otherwise noted.      Objective    /82 (BP Location: Right arm, Patient Position: Sitting, Cuff Size: Adult Large)   Pulse 65   Temp 99  F (37.2  C) (Oral)   Resp 18   Ht 1.753 m (5' 9\")   Wt 126.3 kg (278 lb 8 oz)   SpO2 97%   BMI 41.13 kg/m    Body mass index is 41.13 kg/m .  Physical Exam  Constitutional:       Appearance: Normal appearance.   Eyes:      Extraocular Movements: Extraocular movements intact.   Cardiovascular:      Rate and Rhythm: Normal rate and regular rhythm.   Pulmonary:      Effort: Pulmonary effort is normal.      Breath sounds: Normal breath sounds.   Abdominal:      Palpations: Abdomen is soft.   Musculoskeletal:         General: Normal range of motion.      Cervical back: Normal range of motion.      Comments: Left knee with disc bulging, no redness, no tenderness to palpaton  R Knee: disc bulging, no tenderness    UE: Psoriasis on extensor   Skin:     General: Skin is warm.   Neurological:      General: No focal deficit present.      Mental Status: He is alert and oriented to person, place, and time. Mental status is at baseline.   Psychiatric:         Mood and Affect: Mood normal.         Behavior: Behavior normal.         Thought Content: Thought content " normal.         Judgment: Judgment normal.                    Signed Electronically by: Kirsten Navarro MD

## 2024-04-01 ENCOUNTER — HOSPITAL ENCOUNTER (OUTPATIENT)
Dept: GENERAL RADIOLOGY | Facility: CLINIC | Age: 34
Discharge: HOME | End: 2024-04-01
Attending: GENERAL PRACTICE | Admitting: GENERAL PRACTICE

## 2024-04-01 DIAGNOSIS — G89.29 CHRONIC PAIN OF BOTH KNEES: ICD-10-CM

## 2024-04-01 DIAGNOSIS — M25.562 CHRONIC PAIN OF BOTH KNEES: ICD-10-CM

## 2024-04-01 DIAGNOSIS — M25.561 CHRONIC PAIN OF BOTH KNEES: ICD-10-CM

## 2024-04-01 PROCEDURE — 73560 X-RAY EXAM OF KNEE 1 OR 2: CPT | Mod: 50

## 2024-04-05 NOTE — PROGRESS NOTES
Sports Medicine Clinic           ASSESSMENT and PLAN:     Romeo was seen today for pain and pain.    Diagnoses and all orders for this visit:    Primary osteoarthritis of both knees  Chronic pain of both knees  Psoriasis  Degenerative joint disease of bilateral knees, without any significant history of trauma or injury to the knees.  This is surprising given the patient is only 33 years old.  I do have concerns that this could be related to his psoriasis, which is very poorly controlled.  He has never been seen by dermatology or rheumatology or been on any significant treatment for his psoriasis other than topicals.  He was briefly on a Medrol Dosepak and did see improvement in both his knee pain and his psoriasis.  We discussed today treatment options, I do think it is important that he start physical therapy regardless, as well as see both rheumatology and dermatology.  In the meantime we will put him on a longer course of oral prednisone to hopefully improve both his psoriasis and his knee pain.  Ending on results from that can consider corticosteroid injections, however at the moment his knee plaques prevent me from being able to inject without going through areas of psoriasis.  -Referral to rheumatology  -Referral to dermatology  -PT referral  -Follow-up in 4 weeks, consider bilateral corticosteroid injections  -Prednisone 40 mg for 5 days, followed by slow taper       Return sooner if develops new or worsening symptoms.    Options for treatment and/or follow-up care were reviewed with the patient was actively involved in the decision making process. Patient verbalized understanding and was in agreement with the plan.    Pamela Heard MD, [facility]  Primary Care Sports Medicine             SUBJECTIVE       Romeo Julian is a 33 year old male presenting to clinic today with a chief complaint of bilateral knee pain, referred by his PCP.    Onset: 2 years(s) ago. Reports insidious onset without acute  "precipitating event. Pain began in 2021 with the left knee and 2023 the right knee began hurting.  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 He is a  and it hard for him to work because of his knees. It is hard for him to carry things. He feels like he is putting too much pressure on his knees.    PMH, Medications and Allergies were reviewed and updated as needed.    ROS:  As noted above otherwise negative.    Patient Active Problem List   Diagnosis    Body mass index 40.0-44.9, adult (H)    Reactive hypoglycemia    Adjustment disorder with mixed anxiety and depressed mood    Morbid obesity due to excess calories (H)    Anxiety       Current Outpatient Medications   Medication Sig Dispense Refill    clobetasol (TEMOVATE) 0.05 % external ointment Apply topically 2 times daily Apply twice daily to arms and legs for 2 weeks 60 g 1    ibuprofen (ADVIL/MOTRIN) 200 MG tablet Take 2 tablets (400 mg) by mouth every 8 hours as needed for pain 60 tablet 0            OBJECTIVE:       Vitals:   Vitals:    04/20/24 0857   Weight: 126.1 kg (278 lb)   Height: 1.753 m (5' 9\")     BMI: Body mass index is 41.05 kg/m .    Gen:  Well nourished and in no acute distress  HEENT: Extraocular movement intact  Neck: Supple  Pulm:  Breathing Comfortably. No increased respiratory effort.  Psych: Euthymic. Appropriately answers questions    MSK:   BILATERAL KNEE  Inspection:    Normal alignment; mild edema, erythema, or ecchymosis present, significant psoriasis " plaques over both knees as well as on head, arms and trunk  Palpation:   No tenderness   Remainder of bony and ligamentous landmarks are nontender.    Mild effusion is present    Patellofemoral crepitus is Absent  Range of Motion:     00 extension to 1200 flexion  Strength:    Quadriceps 5/5 and hamstrings 5/5    Extensor mechanism intact  Special Tests:    Negative: MCL/valgus stress (0 & 30 deg), LCL/varus stress (0 & 30 deg), Lachman's, anterior drawer, Wicho's     Imaging was personally reviewed and interpreted by me.   XR KNEE BILATERAL 1/2 VIEWS   4/1/2024 4:01 PM      HISTORY:  B/L Knee Pain; Chronic pain of both knees; Chronic pain of  both knees; Chronic pain of both knees     Comparison: X-ray from 8/25/2020.                                                                      IMPRESSION:     Right: Mild tricompartmental osteoarthrosis. Large effusion. No  fracture or calcified intra-articular body.     Left: Small to moderate effusion. Mild tricompartmental  osteoarthrosis. No fracture or calcified intra-articular body.

## 2024-04-20 ENCOUNTER — OFFICE VISIT (OUTPATIENT)
Dept: ORTHOPEDICS | Facility: CLINIC | Age: 34
End: 2024-04-20
Attending: GENERAL PRACTICE

## 2024-04-20 VITALS — WEIGHT: 278 LBS | HEIGHT: 69 IN | BODY MASS INDEX: 41.18 KG/M2

## 2024-04-20 DIAGNOSIS — G89.29 CHRONIC PAIN OF BOTH KNEES: ICD-10-CM

## 2024-04-20 DIAGNOSIS — M25.562 CHRONIC PAIN OF BOTH KNEES: ICD-10-CM

## 2024-04-20 DIAGNOSIS — M25.561 CHRONIC PAIN OF BOTH KNEES: ICD-10-CM

## 2024-04-20 DIAGNOSIS — L40.9 PSORIASIS: ICD-10-CM

## 2024-04-20 DIAGNOSIS — M17.0 OSTEOARTHRITIS OF BOTH KNEES, UNSPECIFIED OSTEOARTHRITIS TYPE: Primary | ICD-10-CM

## 2024-04-20 PROCEDURE — 99204 OFFICE O/P NEW MOD 45 MIN: CPT | Performed by: STUDENT IN AN ORGANIZED HEALTH CARE EDUCATION/TRAINING PROGRAM

## 2024-04-20 RX ORDER — PREDNISONE 10 MG/1
TABLET ORAL
Qty: 31 TABLET | Refills: 0 | Status: SHIPPED | OUTPATIENT
Start: 2024-04-20 | End: 2024-05-04

## 2024-04-20 NOTE — LETTER
4/20/2024         RE: Romeo Julian  [address] [place] [address]        Dear Colleague,    Thank you for referring your patient, Romeo Julian, to the [facility] [place]. Please see a copy of my visit note below.    Sports Medicine Clinic           ASSESSMENT and PLAN:     Romeo was seen today for pain and pain.    Diagnoses and all orders for this visit:    Primary osteoarthritis of both knees  Chronic pain of both knees  Psoriasis  Degenerative joint disease of bilateral knees, without any significant history of trauma or injury to the knees.  This is surprising given the patient is only 33 years old.  I do have concerns that this could be related to his psoriasis, which is very poorly controlled.  He has never been seen by dermatology or rheumatology or been on any significant treatment for his psoriasis other than topicals.  He was briefly on a Medrol Dosepak and did see improvement in both his knee pain and his psoriasis.  We discussed today treatment options, I do think it is important that he start physical therapy regardless, as well as see both rheumatology and dermatology.  In the meantime we will put him on a longer course of oral prednisone to hopefully improve both his psoriasis and his knee pain.  Ending on results from that can consider corticosteroid injections, however at the moment his knee plaques prevent me from being able to inject without going through areas of psoriasis.  -Referral to rheumatology  -Referral to dermatology  -PT referral  -Follow-up in 4 weeks, consider bilateral corticosteroid injections  -Prednisone 40 mg for 5 days, followed by slow taper       Return sooner if develops new or worsening symptoms.    Options for treatment and/or follow-up care were reviewed with the patient was actively involved in the decision making process. Patient verbalized understanding and was in agreement with the plan.    Pamela  "MD [place], [facility]  Primary Care Sports Medicine             SUBJECTIVE       Romeo Julian is a 33 year old male presenting to clinic today with a chief complaint of bilateral knee pain, referred by his PCP.    Onset: 2 years(s) ago. Reports insidious onset without acute precipitating event. Pain began in 2021 with the left knee and 2023 the right knee began hurting.  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 He is a  and it hard for him to work because of his knees. It is hard for him to carry things. He feels like he is putting too much pressure on his knees.    PMH, Medications and Allergies were reviewed and updated as needed.    ROS:  As noted above otherwise negative.    Patient Active Problem List   Diagnosis     Body mass index 40.0-44.9, adult (H)     Reactive hypoglycemia     Adjustment disorder with mixed anxiety and depressed mood     Morbid obesity due to excess calories (H)     Anxiety       Current Outpatient Medications   Medication Sig Dispense Refill     clobetasol (TEMOVATE) 0.05 % external ointment Apply topically 2 times daily Apply twice daily to arms and legs for 2 weeks 60 g 1     ibuprofen (ADVIL/MOTRIN) 200 MG tablet Take 2 tablets (400 mg) by mouth every 8 hours as needed for pain 60 tablet 0            OBJECTIVE:       Vitals:   Vitals:    04/20/24 0857   Weight: 126.1 kg (278 lb)   Height: 1.753 m (5' 9\")     BMI: Body mass index is 41.05 kg/m .    Gen:  Well nourished and in no acute " distress  HEENT: Extraocular movement intact  Neck: Supple  Pulm:  Breathing Comfortably. No increased respiratory effort.  Psych: Euthymic. Appropriately answers questions    MSK:   BILATERAL KNEE  Inspection:    Normal alignment; mild edema, erythema, or ecchymosis present, significant psoriasis plaques over both knees as well as on head, arms and trunk  Palpation:   No tenderness   Remainder of bony and ligamentous landmarks are nontender.    Mild effusion is present    Patellofemoral crepitus is Absent  Range of Motion:     00 extension to 1200 flexion  Strength:    Quadriceps 5/5 and hamstrings 5/5    Extensor mechanism intact  Special Tests:    Negative: MCL/valgus stress (0 & 30 deg), LCL/varus stress (0 & 30 deg), Lachman's, anterior drawer, Wicho's     Imaging was personally reviewed and interpreted by me.   XR KNEE BILATERAL 1/2 VIEWS   4/1/2024 4:01 PM      HISTORY:  B/L Knee Pain; Chronic pain of both knees; Chronic pain of  both knees; Chronic pain of both knees     Comparison: X-ray from 8/25/2020.                                                                      IMPRESSION:     Right: Mild tricompartmental osteoarthrosis. Large effusion. No  fracture or calcified intra-articular body.     Left: Small to moderate effusion. Mild tricompartmental  osteoarthrosis. No fracture or calcified intra-articular body.           Again, thank you for allowing me to participate in the care of your patient.        Sincerely,        Pamela Heard MD

## 2024-05-02 NOTE — PROGRESS NOTES
Sports Medicine Clinic           ASSESSMENT and PLAN:     Romeo was seen today for pain, follow up, pain and follow up.    Diagnoses and all orders for this visit:    Osteoarthritis of both knees, unspecified osteoarthritis type  Chronic pain of both knees  Psoriasis  Some improvement of psoriasis with oral steroids however no significant change in knee pain. He does have an upcoming appointment with rheumatology in 2 weeks. We did discuss CSI today v waiting to see rheum first and he prefers to wait.   - follow up with rheum  - follow up with sports depending on rheum evaluation    Return sooner if develops new or worsening symptoms.    Options for treatment and/or follow-up care were reviewed with the patient was actively involved in the decision making process. Patient verbalized understanding and was in agreement with the plan.    Pamela Heard MD, [facility]  Primary Care Sports Medicine           SUBJECTIVE       Romeo Julian is a 33 year old male presenting to clinic today for follow up of bilateral knee pain.    Patient was last seen on 4/20/2024, x-ray taken at last visit, started Prednisone 40 mg .    Since their last visit he has seen no improvement but also is not worsening in his condition either. No improvement with Prednisone taper and pt did not purchase OTC psoriasis topical ointment. Pt is using a natural ingredient Mexican topical that seems to be helping with the psoriasis. Pt was unable to start PT due to busy schedule. Pt has upcoming visit with Rheumatology on 5/31/24 and no plan to visit with Dermatology.       PMH, Medications and Allergies were reviewed and updated as needed.    ROS:  As noted above otherwise negative.    Patient Active Problem List   Diagnosis    Body mass index 40.0-44.9, adult (H)    Reactive hypoglycemia    Adjustment disorder with mixed anxiety and depressed mood    Morbid obesity due to excess calories (H)    Anxiety       Current Outpatient Medications  "  Medication Sig Dispense Refill    clobetasol (TEMOVATE) 0.05 % external ointment Apply topically 2 times daily Apply twice daily to arms and legs for 2 weeks 60 g 1    ibuprofen (ADVIL/MOTRIN) 200 MG tablet Take 2 tablets (400 mg) by mouth every 8 hours as needed for pain 60 tablet 0            OBJECTIVE:       Vitals:   Vitals:    05/18/24 0744   BP: 120/70   Weight: 126.1 kg (278 lb)   Height: 1.753 m (5' 9\")     BMI: Body mass index is 41.05 kg/m .    Gen:  Well nourished and in no acute distress  HEENT: Extraocular movement intact  Neck: Supple  Pulm:  Breathing Comfortably. No increased respiratory effort.  Psych: Euthymic. Appropriately answers questions    MSK:   BILATERAL KNEE  Inspection:    Normal alignment; mild edema L>R, erythema, or ecchymosis present, significant psoriasis plaques over both knees as well as on head, arms and trunk however improved from previous  Palpation:   No tenderness   Remainder of bony and ligamentous landmarks are nontender.    Mild effusion is present    Patellofemoral crepitus is Absent  Range of Motion:     00 extension to 1200 flexion  Strength:    Quadriceps 5/5 and hamstrings 5/5    Extensor mechanism intact       "

## 2024-05-18 ENCOUNTER — OFFICE VISIT (OUTPATIENT)
Dept: ORTHOPEDICS | Facility: CLINIC | Age: 34
End: 2024-05-18

## 2024-05-18 VITALS
HEIGHT: 69 IN | BODY MASS INDEX: 41.18 KG/M2 | WEIGHT: 278 LBS | SYSTOLIC BLOOD PRESSURE: 120 MMHG | DIASTOLIC BLOOD PRESSURE: 70 MMHG

## 2024-05-18 DIAGNOSIS — L40.9 PSORIASIS: ICD-10-CM

## 2024-05-18 DIAGNOSIS — M25.562 CHRONIC PAIN OF BOTH KNEES: ICD-10-CM

## 2024-05-18 DIAGNOSIS — G89.29 CHRONIC PAIN OF BOTH KNEES: ICD-10-CM

## 2024-05-18 DIAGNOSIS — M25.561 CHRONIC PAIN OF BOTH KNEES: ICD-10-CM

## 2024-05-18 DIAGNOSIS — M17.0 OSTEOARTHRITIS OF BOTH KNEES, UNSPECIFIED OSTEOARTHRITIS TYPE: Primary | ICD-10-CM

## 2024-05-18 PROCEDURE — 99213 OFFICE O/P EST LOW 20 MIN: CPT | Performed by: STUDENT IN AN ORGANIZED HEALTH CARE EDUCATION/TRAINING PROGRAM

## 2024-05-18 NOTE — LETTER
5/18/2024         RE: Romeo Julian  [address]  [place] [address]        Dear Colleague,    Thank you for referring your patient, Romeo Julian, to the [facility] [place]. Please see a copy of my visit note below.      Sports Medicine Clinic           ASSESSMENT and PLAN:     Romeo was seen today for pain, follow up, pain and follow up.    Diagnoses and all orders for this visit:    Osteoarthritis of both knees, unspecified osteoarthritis type  Chronic pain of both knees  Psoriasis  Some improvement of psoriasis with oral steroids however no significant change in knee pain. He does have an upcoming appointment with rheumatology in 2 weeks. We did discuss CSI today v waiting to see rheum first and he prefers to wait.   - follow up with rheum  - follow up with sports depending on rheum evaluation    Return sooner if develops new or worsening symptoms.    Options for treatment and/or follow-up care were reviewed with the patient was actively involved in the decision making process. Patient verbalized understanding and was in agreement with the plan.    Pamela Heard MD, [facility]  Primary Care Sports Medicine           SUBJECTIVE       Romeo Julian is a 33 year old male presenting to clinic today for follow up of bilateral knee pain.    Patient was last seen on 4/20/2024, x-ray taken at last visit, started Prednisone 40 mg .    Since their last visit he has seen no improvement but also is not worsening in his condition either. No improvement with Prednisone taper and pt did not purchase OTC psoriasis topical ointment. Pt is using a natural ingredient Mexican topical that seems to be helping with the psoriasis. Pt was unable to start PT due to busy schedule. Pt has upcoming visit with Rheumatology on 5/31/24 and no plan to visit with Dermatology.       PMH, Medications and Allergies were reviewed and updated as needed.    ROS:  As noted above  "otherwise negative.    Patient Active Problem List   Diagnosis     Body mass index 40.0-44.9, adult (H)     Reactive hypoglycemia     Adjustment disorder with mixed anxiety and depressed mood     Morbid obesity due to excess calories (H)     Anxiety       Current Outpatient Medications   Medication Sig Dispense Refill     clobetasol (TEMOVATE) 0.05 % external ointment Apply topically 2 times daily Apply twice daily to arms and legs for 2 weeks 60 g 1     ibuprofen (ADVIL/MOTRIN) 200 MG tablet Take 2 tablets (400 mg) by mouth every 8 hours as needed for pain 60 tablet 0            OBJECTIVE:       Vitals:   Vitals:    05/18/24 0744   BP: 120/70   Weight: 126.1 kg (278 lb)   Height: 1.753 m (5' 9\")     BMI: Body mass index is 41.05 kg/m .    Gen:  Well nourished and in no acute distress  HEENT: Extraocular movement intact  Neck: Supple  Pulm:  Breathing Comfortably. No increased respiratory effort.  Psych: Euthymic. Appropriately answers questions    MSK:   BILATERAL KNEE  Inspection:    Normal alignment; mild edema L>R, erythema, or ecchymosis present, significant psoriasis plaques over both knees as well as on head, arms and trunk however improved from previous  Palpation:   No tenderness   Remainder of bony and ligamentous landmarks are nontender.    Mild effusion is present    Patellofemoral crepitus is Absent  Range of Motion:     00 extension to 1200 flexion  Strength:    Quadriceps 5/5 and hamstrings 5/5    Extensor mechanism intact           Again, thank you for allowing me to participate in the care of your patient.        Sincerely,        Pamela Heard MD  "

## 2024-05-31 ENCOUNTER — OFFICE VISIT (OUTPATIENT)
Dept: RHEUMATOLOGY | Facility: CLINIC | Age: 34
End: 2024-05-31

## 2024-05-31 VITALS
DIASTOLIC BLOOD PRESSURE: 82 MMHG | SYSTOLIC BLOOD PRESSURE: 128 MMHG | OXYGEN SATURATION: 98 % | BODY MASS INDEX: 42.21 KG/M2 | HEIGHT: 69 IN | HEART RATE: 58 BPM | WEIGHT: 285 LBS

## 2024-05-31 DIAGNOSIS — M19.90 INFLAMMATORY ARTHRITIS: Primary | ICD-10-CM

## 2024-05-31 PROCEDURE — 99205 OFFICE O/P NEW HI 60 MIN: CPT | Performed by: INTERNAL MEDICINE

## 2024-05-31 RX ORDER — METHOTREXATE 2.5 MG/1
20 TABLET ORAL
Qty: 32 TABLET | Refills: 2 | Status: CANCELLED | OUTPATIENT
Start: 2024-05-31

## 2024-05-31 ASSESSMENT — PAIN SCALES - GENERAL: PAINLEVEL: NO PAIN (0)

## 2024-06-01 NOTE — PROGRESS NOTES
New referral by Dr. Pamela Heard for psoriatic arthritis.    Romeo is 33 years old and is accompanied by his wife.  His main problem is bilateral knee pain with swelling.  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  This is to an ongoing knee problem that is slowly getting worse over the last 3 years.  He does not take any major pain medications, but he is now getting unsteady on his feet.  He is also developed skin lesions that are thought to be psoriasis, although he has not been evaluated by dermatologist for this condition.  Specifically he denies any back pain.    Family history  There is no psoriasis in the family or any other autoimmune disease.    Social history  He is a  by [employer] and runs his own company and is doing primarily supervisory work because of his knee problem  He is  and he has 2 kids  He is a non-smoker and uses alcohol rarely.    Past medical history  He has had ongoing knee problems that are slowly getting worse over the last 5 years.    Physical examination  His vital signs are normal but his BMI is 42 with a weight of 285 pounds  I have asked examined his skin of the extremities and it seems to be consistent with small patches of psoriasis.  Examination of nails does not show any convincing pitting or onycholysis both on the fingernails or toenails.  There are no sausage digits  Joint examination shows that there is no active synovitis in the hands, wrist, elbows and shoulders and he has full range of motion with normal strength.  Lower extremity exam shows that he has no warmth in both knees but he has cool effusions and crepitation bilaterally.  Ankles and feet are essentially unremarkable with no active synovitis.  He does have collapsed arches.    I reviewed his blood test results and that shows that he had negative rheumatoid factor and CCP antibody.  There were no inflammatory markers.   CBC and chemistries were essentially normal.    Reviewed his x-rays of his knees and they show progressive osteoarthritis over the years he had x-rays done in 2018 2020 and 2023.    Impression/plan  1.  His skin rash seems to be consistent with psoriasis but I would like confirmation from dermatology.  2.  He has significant osteoarthritis in both knees but there is no active inflammatory disease today.  3.  He does not have any nail pitting, onycholysis, inflammatory arthritis in any other joint, no low back pain, no family history of psoriasis or psoriatic arthritis.  Taken together all these things are not suggestive of psoriatic arthritis.  4.  In conclusion, it seems that he has 2 issues being mild skin psoriasis and severe primary osteoarthritis of the knees.  Contributing factors to primary osteoarthritis of the knees are longstanding obesity and manual labor.  5.  Will plan to do blood work now and I would be happy to give him prescription for methotrexate to help with his skin disease, provided that his LFTs are normal.  6.  I also suggested that he could consider going to consult a surgeon for potential knee replacement although he is very young to have this done at this age.  7.  Follow-up in 3 months    60 minutes spent on the date of the encounter doing chart review, history and exam, documentation and further activities per the note.

## 2024-07-06 ENCOUNTER — HEALTH MAINTENANCE LETTER (OUTPATIENT)
Age: 34
End: 2024-07-06

## 2025-07-13 ENCOUNTER — HEALTH MAINTENANCE LETTER (OUTPATIENT)
Age: 35
End: 2025-07-13